# Patient Record
Sex: FEMALE | Race: WHITE | NOT HISPANIC OR LATINO
[De-identification: names, ages, dates, MRNs, and addresses within clinical notes are randomized per-mention and may not be internally consistent; named-entity substitution may affect disease eponyms.]

---

## 2017-03-30 ENCOUNTER — APPOINTMENT (OUTPATIENT)
Dept: GERIATRICS | Facility: CLINIC | Age: 67
End: 2017-03-30

## 2017-03-30 ENCOUNTER — OTHER (OUTPATIENT)
Age: 67
End: 2017-03-30

## 2017-03-30 VITALS
HEART RATE: 92 BPM | OXYGEN SATURATION: 94 % | WEIGHT: 96 LBS | RESPIRATION RATE: 16 BRPM | TEMPERATURE: 97.6 F | DIASTOLIC BLOOD PRESSURE: 82 MMHG | HEIGHT: 62 IN | BODY MASS INDEX: 17.66 KG/M2 | SYSTOLIC BLOOD PRESSURE: 142 MMHG

## 2017-03-30 DIAGNOSIS — L65.9 NONSCARRING HAIR LOSS, UNSPECIFIED: ICD-10-CM

## 2017-03-30 LAB
ALBUMIN SERPL ELPH-MCNC: 4 G/DL
ALP BLD-CCNC: 112 U/L
ALT SERPL-CCNC: 17 U/L
ANION GAP SERPL CALC-SCNC: 19 MMOL/L
AST SERPL-CCNC: 28 U/L
BILIRUB SERPL-MCNC: 1.2 MG/DL
BUN SERPL-MCNC: 29 MG/DL
CALCIUM SERPL-MCNC: 10 MG/DL
CHLORIDE SERPL-SCNC: 103 MMOL/L
CO2 SERPL-SCNC: 20 MMOL/L
CREAT SERPL-MCNC: 0.99 MG/DL
CRP SERPL-MCNC: <0.2 MG/DL
GLUCOSE SERPL-MCNC: 103 MG/DL
INR PPP: 1.12 RATIO
POTASSIUM SERPL-SCNC: 4.1 MMOL/L
PROT SERPL-MCNC: 7.1 G/DL
PT BLD: 12.7 SEC
SODIUM SERPL-SCNC: 142 MMOL/L
TSH SERPL-ACNC: 1.5 UIU/ML

## 2017-03-31 PROBLEM — L65.9 HAIR LOSS: Status: ACTIVE | Noted: 2017-03-30

## 2017-03-31 LAB
BASOPHILS # BLD AUTO: 0.07 K/UL
BASOPHILS NFR BLD AUTO: 1.3 %
EOSINOPHIL # BLD AUTO: 0.12 K/UL
EOSINOPHIL NFR BLD AUTO: 2.2 %
HCT VFR BLD CALC: 44.4 %
HGB BLD-MCNC: 14.6 G/DL
IMM GRANULOCYTES NFR BLD AUTO: 0.2 %
LYMPHOCYTES # BLD AUTO: 1.1 K/UL
LYMPHOCYTES NFR BLD AUTO: 20.3 %
MAN DIFF?: NORMAL
MCHC RBC-ENTMCNC: 29.9 PG
MCHC RBC-ENTMCNC: 32.9 GM/DL
MCV RBC AUTO: 90.8 FL
MONOCYTES # BLD AUTO: 0.53 K/UL
MONOCYTES NFR BLD AUTO: 9.8 %
NEUTROPHILS # BLD AUTO: 3.58 K/UL
NEUTROPHILS NFR BLD AUTO: 66.2 %
PLATELET # BLD AUTO: 98 K/UL
RBC # BLD: 4.89 M/UL
RBC # FLD: 14.7 %
WBC # FLD AUTO: 5.41 K/UL

## 2017-04-03 LAB
ADJUSTED MITOGEN: 0.64 IU/ML
ADJUSTED TB AG: 0.06 IU/ML
M TB IFN-G BLD-IMP: NEGATIVE
QUANTIFERON GOLD NIL: 0.04 IU/ML

## 2017-04-13 ENCOUNTER — APPOINTMENT (OUTPATIENT)
Dept: GERIATRICS | Facility: CLINIC | Age: 67
End: 2017-04-13

## 2017-04-13 VITALS
HEIGHT: 62 IN | RESPIRATION RATE: 15 BRPM | OXYGEN SATURATION: 98 % | HEART RATE: 76 BPM | SYSTOLIC BLOOD PRESSURE: 120 MMHG | DIASTOLIC BLOOD PRESSURE: 70 MMHG | TEMPERATURE: 98 F | WEIGHT: 95 LBS | BODY MASS INDEX: 17.48 KG/M2

## 2017-06-12 ENCOUNTER — APPOINTMENT (OUTPATIENT)
Dept: GERIATRICS | Facility: CLINIC | Age: 67
End: 2017-06-12

## 2017-06-12 ENCOUNTER — LABORATORY RESULT (OUTPATIENT)
Age: 67
End: 2017-06-12

## 2017-06-12 VITALS
WEIGHT: 99 LBS | SYSTOLIC BLOOD PRESSURE: 110 MMHG | BODY MASS INDEX: 18.22 KG/M2 | TEMPERATURE: 97.8 F | DIASTOLIC BLOOD PRESSURE: 70 MMHG | HEIGHT: 62 IN | HEART RATE: 80 BPM | OXYGEN SATURATION: 98 % | RESPIRATION RATE: 15 BRPM

## 2017-06-12 RX ORDER — ERGOCALCIFEROL 1.25 MG/1
1.25 MG CAPSULE, LIQUID FILLED ORAL
Qty: 8 | Refills: 0 | Status: DISCONTINUED | COMMUNITY
Start: 2017-03-30 | End: 2017-06-12

## 2017-06-12 RX ORDER — AMOXICILLIN 500 MG/1
500 CAPSULE ORAL
Qty: 21 | Refills: 0 | Status: DISCONTINUED | COMMUNITY
Start: 2017-05-08

## 2017-06-15 LAB
25(OH)D3 SERPL-MCNC: 35.4 NG/ML
ANION GAP SERPL CALC-SCNC: 18 MMOL/L
BASOPHILS # BLD AUTO: 0 K/UL
BASOPHILS NFR BLD AUTO: 0 %
BUN SERPL-MCNC: 26 MG/DL
CALCIUM SERPL-MCNC: 9.6 MG/DL
CHLORIDE SERPL-SCNC: 103 MMOL/L
CO2 SERPL-SCNC: 24 MMOL/L
CREAT SERPL-MCNC: 1.12 MG/DL
EOSINOPHIL # BLD AUTO: 0.14 K/UL
EOSINOPHIL NFR BLD AUTO: 2.7 %
GLUCOSE SERPL-MCNC: 93 MG/DL
HCT VFR BLD CALC: 43.8 %
HGB BLD-MCNC: 14.2 G/DL
LYMPHOCYTES # BLD AUTO: 1.32 K/UL
LYMPHOCYTES NFR BLD AUTO: 25.5 %
MAN DIFF?: NORMAL
MCHC RBC-ENTMCNC: 30.1 PG
MCHC RBC-ENTMCNC: 32.4 GM/DL
MCV RBC AUTO: 93 FL
MONOCYTES # BLD AUTO: 0.47 K/UL
MONOCYTES NFR BLD AUTO: 9.1 %
NEUTROPHILS # BLD AUTO: 3.24 K/UL
NEUTROPHILS NFR BLD AUTO: 59.1 %
PLATELET # BLD AUTO: 77 K/UL
POTASSIUM SERPL-SCNC: 5.2 MMOL/L
RBC # BLD: 4.71 M/UL
RBC # FLD: 14.8 %
SODIUM SERPL-SCNC: 145 MMOL/L
WBC # FLD AUTO: 5.16 K/UL

## 2017-09-11 ENCOUNTER — RX RENEWAL (OUTPATIENT)
Age: 67
End: 2017-09-11

## 2017-09-14 ENCOUNTER — TRANSCRIPTION ENCOUNTER (OUTPATIENT)
Age: 67
End: 2017-09-14

## 2017-09-18 ENCOUNTER — TRANSCRIPTION ENCOUNTER (OUTPATIENT)
Age: 67
End: 2017-09-18

## 2017-10-12 ENCOUNTER — APPOINTMENT (OUTPATIENT)
Dept: GERIATRICS | Facility: CLINIC | Age: 67
End: 2017-10-12
Payer: MEDICARE

## 2017-10-12 VITALS
OXYGEN SATURATION: 98 % | RESPIRATION RATE: 15 BRPM | HEART RATE: 77 BPM | BODY MASS INDEX: 17.67 KG/M2 | SYSTOLIC BLOOD PRESSURE: 130 MMHG | DIASTOLIC BLOOD PRESSURE: 78 MMHG | TEMPERATURE: 97.8 F | WEIGHT: 96.03 LBS | HEIGHT: 62 IN

## 2017-10-12 DIAGNOSIS — Z92.89 PERSONAL HISTORY OF OTHER MEDICAL TREATMENT: ICD-10-CM

## 2017-10-12 DIAGNOSIS — Z12.4 ENCOUNTER FOR SCREENING FOR MALIGNANT NEOPLASM OF CERVIX: ICD-10-CM

## 2017-10-12 DIAGNOSIS — Z01.419 ENCOUNTER FOR GYNECOLOGICAL EXAMINATION (GENERAL) (ROUTINE) W/OUT ABNORMAL FINDINGS: ICD-10-CM

## 2017-10-12 DIAGNOSIS — R63.4 ABNORMAL WEIGHT LOSS: ICD-10-CM

## 2017-10-12 PROCEDURE — 99214 OFFICE O/P EST MOD 30 MIN: CPT

## 2017-11-09 ENCOUNTER — APPOINTMENT (OUTPATIENT)
Dept: GERIATRICS | Facility: CLINIC | Age: 67
End: 2017-11-09

## 2017-12-07 ENCOUNTER — LABORATORY RESULT (OUTPATIENT)
Age: 67
End: 2017-12-07

## 2017-12-07 ENCOUNTER — APPOINTMENT (OUTPATIENT)
Dept: PULMONOLOGY | Facility: CLINIC | Age: 67
End: 2017-12-07
Payer: MEDICARE

## 2017-12-07 VITALS
HEART RATE: 93 BPM | RESPIRATION RATE: 16 BRPM | OXYGEN SATURATION: 97 % | WEIGHT: 95 LBS | BODY MASS INDEX: 17.48 KG/M2 | DIASTOLIC BLOOD PRESSURE: 90 MMHG | TEMPERATURE: 98.5 F | SYSTOLIC BLOOD PRESSURE: 130 MMHG | HEIGHT: 62 IN

## 2017-12-07 PROCEDURE — 94729 DIFFUSING CAPACITY: CPT

## 2017-12-07 PROCEDURE — 94726 PLETHYSMOGRAPHY LUNG VOLUMES: CPT

## 2017-12-07 PROCEDURE — 99204 OFFICE O/P NEW MOD 45 MIN: CPT | Mod: 25

## 2017-12-07 PROCEDURE — ZZZZZ: CPT

## 2017-12-07 PROCEDURE — 94060 EVALUATION OF WHEEZING: CPT

## 2017-12-08 ENCOUNTER — RX RENEWAL (OUTPATIENT)
Age: 67
End: 2017-12-08

## 2017-12-08 ENCOUNTER — MEDICATION RENEWAL (OUTPATIENT)
Age: 67
End: 2017-12-08

## 2017-12-14 LAB
A ALTERNATA IGE QN: <0.1 KUA/L
A FUMIGATUS IGE QN: <0.1 KUA/L
C ALBICANS IGE QN: <0.1 KUA/L
C HERBARUM IGE QN: <0.1 KUA/L
CAT DANDER IGE QN: 14.4 KUA/L
COMMON RAGWEED IGE QN: <0.1 KUA/L
D FARINAE IGE QN: 0.75 KUA/L
D PTERONYSS IGE QN: 0.44 KUA/L
DEPRECATED A ALTERNATA IGE RAST QL: 0
DEPRECATED A FUMIGATUS IGE RAST QL: 0
DEPRECATED C ALBICANS IGE RAST QL: 0
DEPRECATED C HERBARUM IGE RAST QL: 0
DEPRECATED CAT DANDER IGE RAST QL: ABNORMAL
DEPRECATED COMMON RAGWEED IGE RAST QL: 0
DEPRECATED D FARINAE IGE RAST QL: ABNORMAL
DEPRECATED D PTERONYSS IGE RAST QL: ABNORMAL
DEPRECATED DOG DANDER IGE RAST QL: ABNORMAL
DEPRECATED M RACEMOSUS IGE RAST QL: 0
DEPRECATED ROACH IGE RAST QL: 0
DEPRECATED TIMOTHY IGE RAST QL: 0
DEPRECATED WHITE OAK IGE RAST QL: 0
DOG DANDER IGE QN: 1.82 KUA/L
M RACEMOSUS IGE QN: <0.1 KUA/L
ROACH IGE QN: <0.1 KUA/L
TIMOTHY IGE QN: <0.1 KUA/L
WHITE OAK IGE QN: <0.1 KUA/L

## 2018-01-16 ENCOUNTER — APPOINTMENT (OUTPATIENT)
Dept: PULMONOLOGY | Facility: CLINIC | Age: 68
End: 2018-01-16
Payer: MEDICARE

## 2018-01-16 VITALS
HEART RATE: 88 BPM | RESPIRATION RATE: 16 BRPM | BODY MASS INDEX: 18.77 KG/M2 | DIASTOLIC BLOOD PRESSURE: 90 MMHG | HEIGHT: 62 IN | OXYGEN SATURATION: 97 % | SYSTOLIC BLOOD PRESSURE: 140 MMHG | WEIGHT: 102 LBS | TEMPERATURE: 98.5 F

## 2018-01-16 PROCEDURE — 99215 OFFICE O/P EST HI 40 MIN: CPT

## 2018-01-19 LAB — A1AT SERPL-MCNC: 160 MG/DL

## 2018-02-15 ENCOUNTER — APPOINTMENT (OUTPATIENT)
Dept: GERIATRICS | Facility: CLINIC | Age: 68
End: 2018-02-15
Payer: MEDICARE

## 2018-02-15 VITALS
DIASTOLIC BLOOD PRESSURE: 70 MMHG | HEART RATE: 75 BPM | WEIGHT: 106.04 LBS | RESPIRATION RATE: 16 BRPM | TEMPERATURE: 97.6 F | SYSTOLIC BLOOD PRESSURE: 140 MMHG | BODY MASS INDEX: 19.51 KG/M2 | HEIGHT: 62 IN | OXYGEN SATURATION: 96 %

## 2018-02-15 PROCEDURE — 99214 OFFICE O/P EST MOD 30 MIN: CPT | Mod: GC

## 2018-03-06 ENCOUNTER — APPOINTMENT (OUTPATIENT)
Dept: DERMATOLOGY | Facility: CLINIC | Age: 68
End: 2018-03-06
Payer: MEDICARE

## 2018-03-06 VITALS
WEIGHT: 104 LBS | DIASTOLIC BLOOD PRESSURE: 70 MMHG | HEIGHT: 62 IN | BODY MASS INDEX: 19.14 KG/M2 | SYSTOLIC BLOOD PRESSURE: 118 MMHG

## 2018-03-06 DIAGNOSIS — I78.1 NEVUS, NON-NEOPLASTIC: ICD-10-CM

## 2018-03-06 DIAGNOSIS — Z86.19 PERSONAL HISTORY OF OTHER INFECTIOUS AND PARASITIC DISEASES: ICD-10-CM

## 2018-03-06 DIAGNOSIS — Z86.79 PERSONAL HISTORY OF OTHER DISEASES OF THE CIRCULATORY SYSTEM: ICD-10-CM

## 2018-03-06 DIAGNOSIS — L72.0 EPIDERMAL CYST: ICD-10-CM

## 2018-03-06 DIAGNOSIS — Z87.19 PERSONAL HISTORY OF OTHER DISEASES OF THE DIGESTIVE SYSTEM: ICD-10-CM

## 2018-03-06 PROCEDURE — 99203 OFFICE O/P NEW LOW 30 MIN: CPT

## 2018-04-10 ENCOUNTER — APPOINTMENT (OUTPATIENT)
Dept: PULMONOLOGY | Facility: CLINIC | Age: 68
End: 2018-04-10
Payer: MEDICARE

## 2018-04-10 VITALS
HEIGHT: 62 IN | DIASTOLIC BLOOD PRESSURE: 70 MMHG | WEIGHT: 106 LBS | SYSTOLIC BLOOD PRESSURE: 130 MMHG | BODY MASS INDEX: 19.51 KG/M2 | TEMPERATURE: 98.3 F | HEART RATE: 80 BPM | RESPIRATION RATE: 16 BRPM

## 2018-04-10 PROCEDURE — 99215 OFFICE O/P EST HI 40 MIN: CPT

## 2018-04-10 RX ORDER — TIOTROPIUM BROMIDE 18 UG/1
18 CAPSULE ORAL; RESPIRATORY (INHALATION)
Qty: 30 | Refills: 5 | Status: DISCONTINUED | COMMUNITY
Start: 2017-12-07 | End: 2018-04-10

## 2018-05-02 ENCOUNTER — APPOINTMENT (OUTPATIENT)
Dept: PULMONOLOGY | Facility: CLINIC | Age: 68
End: 2018-05-02

## 2018-05-17 ENCOUNTER — APPOINTMENT (OUTPATIENT)
Dept: GERIATRICS | Facility: CLINIC | Age: 68
End: 2018-05-17
Payer: MEDICARE

## 2018-05-17 VITALS — WEIGHT: 107 LBS | BODY MASS INDEX: 19.57 KG/M2

## 2018-05-17 VITALS
WEIGHT: 117 LBS | SYSTOLIC BLOOD PRESSURE: 140 MMHG | HEART RATE: 68 BPM | DIASTOLIC BLOOD PRESSURE: 80 MMHG | BODY MASS INDEX: 21.4 KG/M2 | OXYGEN SATURATION: 98 % | TEMPERATURE: 98 F

## 2018-05-17 DIAGNOSIS — Z23 ENCOUNTER FOR IMMUNIZATION: ICD-10-CM

## 2018-05-17 PROCEDURE — 99214 OFFICE O/P EST MOD 30 MIN: CPT

## 2018-05-17 RX ORDER — ZOSTER VACCINE RECOMBINANT, ADJUVANTED 50 MCG/0.5
50 KIT INTRAMUSCULAR
Qty: 1 | Refills: 0 | Status: DISCONTINUED | COMMUNITY
Start: 2018-05-17 | End: 2018-05-17

## 2018-05-19 PROBLEM — Z23 NEED FOR SHINGLES VACCINE: Status: ACTIVE | Noted: 2018-05-17

## 2018-09-08 ENCOUNTER — MEDICATION RENEWAL (OUTPATIENT)
Age: 68
End: 2018-09-08

## 2018-10-26 ENCOUNTER — APPOINTMENT (OUTPATIENT)
Dept: GERIATRICS | Facility: CLINIC | Age: 68
End: 2018-10-26
Payer: MEDICARE

## 2018-10-26 ENCOUNTER — LABORATORY RESULT (OUTPATIENT)
Age: 68
End: 2018-10-26

## 2018-10-26 VITALS
WEIGHT: 110 LBS | TEMPERATURE: 97.5 F | DIASTOLIC BLOOD PRESSURE: 70 MMHG | BODY MASS INDEX: 20.12 KG/M2 | HEART RATE: 79 BPM | OXYGEN SATURATION: 98 % | SYSTOLIC BLOOD PRESSURE: 132 MMHG

## 2018-10-26 VITALS — SYSTOLIC BLOOD PRESSURE: 132 MMHG | DIASTOLIC BLOOD PRESSURE: 70 MMHG

## 2018-10-26 DIAGNOSIS — Z23 ENCOUNTER FOR IMMUNIZATION: ICD-10-CM

## 2018-10-26 DIAGNOSIS — H54.7 UNSPECIFIED VISUAL LOSS: ICD-10-CM

## 2018-10-26 PROCEDURE — G0008: CPT | Mod: GC

## 2018-10-26 PROCEDURE — 90662 IIV NO PRSV INCREASED AG IM: CPT | Mod: GC

## 2018-10-26 PROCEDURE — 99214 OFFICE O/P EST MOD 30 MIN: CPT | Mod: GC,25

## 2018-10-30 LAB
ALBUMIN SERPL ELPH-MCNC: 4.2 G/DL
ALP BLD-CCNC: 96 U/L
ALT SERPL-CCNC: 17 U/L
ANION GAP SERPL CALC-SCNC: 13 MMOL/L
AST SERPL-CCNC: 25 U/L
BASOPHILS # BLD AUTO: 0.04 K/UL
BASOPHILS NFR BLD AUTO: 0.9 %
BILIRUB SERPL-MCNC: 0.9 MG/DL
BUN SERPL-MCNC: 24 MG/DL
CALCIUM SERPL-MCNC: 9.6 MG/DL
CHLORIDE SERPL-SCNC: 104 MMOL/L
CO2 SERPL-SCNC: 23 MMOL/L
CREAT SERPL-MCNC: 0.92 MG/DL
EOSINOPHIL # BLD AUTO: 0.16 K/UL
EOSINOPHIL NFR BLD AUTO: 3.5 %
GLUCOSE SERPL-MCNC: 91 MG/DL
HCT VFR BLD CALC: 40.1 %
HGB BLD-MCNC: 13.6 G/DL
IMM GRANULOCYTES NFR BLD AUTO: 0.2 %
LYMPHOCYTES # BLD AUTO: 1 K/UL
LYMPHOCYTES NFR BLD AUTO: 21.9 %
MAN DIFF?: NORMAL
MCHC RBC-ENTMCNC: 31.1 PG
MCHC RBC-ENTMCNC: 33.9 GM/DL
MCV RBC AUTO: 91.8 FL
MONOCYTES # BLD AUTO: 0.47 K/UL
MONOCYTES NFR BLD AUTO: 10.3 %
NEUTROPHILS # BLD AUTO: 2.88 K/UL
NEUTROPHILS NFR BLD AUTO: 63.2 %
PLATELET # BLD AUTO: 69 K/UL
POTASSIUM SERPL-SCNC: 4.2 MMOL/L
PROT SERPL-MCNC: 6.6 G/DL
RBC # BLD: 4.37 M/UL
RBC # FLD: 14.2 %
SODIUM SERPL-SCNC: 140 MMOL/L
WBC # FLD AUTO: 4.56 K/UL

## 2018-11-02 ENCOUNTER — RX RENEWAL (OUTPATIENT)
Age: 68
End: 2018-11-02

## 2018-11-19 ENCOUNTER — RX RENEWAL (OUTPATIENT)
Age: 68
End: 2018-11-19

## 2019-02-21 ENCOUNTER — APPOINTMENT (OUTPATIENT)
Dept: OTOLARYNGOLOGY | Facility: CLINIC | Age: 69
End: 2019-02-21
Payer: MEDICARE

## 2019-02-21 VITALS
DIASTOLIC BLOOD PRESSURE: 90 MMHG | HEART RATE: 83 BPM | BODY MASS INDEX: 19.32 KG/M2 | HEIGHT: 62 IN | SYSTOLIC BLOOD PRESSURE: 145 MMHG | WEIGHT: 105 LBS

## 2019-02-21 DIAGNOSIS — H61.23 IMPACTED CERUMEN, BILATERAL: ICD-10-CM

## 2019-02-21 DIAGNOSIS — H90.3 SENSORINEURAL HEARING LOSS, BILATERAL: ICD-10-CM

## 2019-02-21 PROCEDURE — 92567 TYMPANOMETRY: CPT

## 2019-02-21 PROCEDURE — 99203 OFFICE O/P NEW LOW 30 MIN: CPT | Mod: 25

## 2019-02-21 PROCEDURE — 92557 COMPREHENSIVE HEARING TEST: CPT

## 2019-02-21 PROCEDURE — 69210 REMOVE IMPACTED EAR WAX UNI: CPT

## 2019-02-21 NOTE — ASSESSMENT
[FreeTextEntry1] : Ms. BHATTI is a 68 year female with b/l sensorineural hearing loss, cerumen.  \par - hearing aid evaluation \par - annual audio\par - cerumen removal prn \par HTN\par - F/U with PMD

## 2019-02-21 NOTE — HISTORY OF PRESENT ILLNESS
[de-identified] : 69 y/o F with several years of decreased hearing b/l.  No tinnitus, no d/c, no pain, no Vertigo.  No nasal congestion, no sinus pain or pressure.   no hx of loud noise exposure.

## 2019-02-21 NOTE — PROCEDURE
[FreeTextEntry3] : Procedure - Cerumen Removal. \par After informed verbal consent is obtained, cerumen is removed from the b/l ear canal with a curette.  Normal appearing canal following removal.\par

## 2019-02-21 NOTE — CONSULT LETTER
[Dear  ___] : Dear  [unfilled], [Courtesy Letter:] : I had the pleasure of seeing your patient, [unfilled], in my office today. [Please see my note below.] : Please see my note below. [Consult Closing:] : Thank you very much for allowing me to participate in the care of this patient.  If you have any questions, please do not hesitate to contact me. [Sincerely,] : Sincerely, [FreeTextEntry3] : Maura Chamorro M.D.\par Attending Physician,  \par Department of Otolaryngology - Head and Neck Surgery\par [facility] \par Office: [phone]\par Fax: [phone]\par

## 2019-02-21 NOTE — PHYSICAL EXAM
[Midline] : trachea located in midline position [Normal] : no rashes [de-identified] : b/l isaac.

## 2019-04-02 ENCOUNTER — APPOINTMENT (OUTPATIENT)
Dept: ORTHOPEDIC SURGERY | Facility: CLINIC | Age: 69
End: 2019-04-02
Payer: MEDICARE

## 2019-04-02 ENCOUNTER — APPOINTMENT (OUTPATIENT)
Age: 69
End: 2019-04-02
Payer: MEDICARE

## 2019-04-02 VITALS
HEART RATE: 85 BPM | BODY MASS INDEX: 18.84 KG/M2 | DIASTOLIC BLOOD PRESSURE: 86 MMHG | SYSTOLIC BLOOD PRESSURE: 143 MMHG | HEIGHT: 62.5 IN | WEIGHT: 105 LBS

## 2019-04-02 DIAGNOSIS — S42.202A UNSPECIFIED FRACTURE OF UPPER END OF LEFT HUMERUS, INITIAL ENCOUNTER FOR CLOSED FRACTURE: ICD-10-CM

## 2019-04-02 PROCEDURE — 73030 X-RAY EXAM OF SHOULDER: CPT | Mod: LT

## 2019-04-02 PROCEDURE — 99203 OFFICE O/P NEW LOW 30 MIN: CPT

## 2019-04-02 PROCEDURE — 73060 X-RAY EXAM OF HUMERUS: CPT | Mod: LT

## 2019-04-02 NOTE — DISCUSSION/SUMMARY
[de-identified] : Discussion had with patient, sling, rest, ice \par Patient will follow up with Liang next week \par Patients questions were answered and patient is satisfied with today's visit\par \par \par Rings removed from hand, given to patient

## 2019-04-02 NOTE — HISTORY OF PRESENT ILLNESS
[Pain Location] : pain [de-identified] : Patient is a 69 year old female who presents c/o left shoulder and upper arm pain s/p falling yesterday. patient tripped while walking dog yesterday landing on left shoulder.  no numbness or weakness to arm. patient states decreased ROM due to pain.

## 2019-04-02 NOTE — PHYSICAL EXAM
[UE/LE] : Sensory: Intact in bilateral upper & lower extremities [ALL] : dorsalis pedis, posterior tibial, femoral, popliteal, and radial 2+ and symmetric bilaterally [Normal] : Oriented to person, place, and time, insight and judgement were intact and the affect was normal [Poor Appearance] : well-appearing [Acute Distress] : not in acute distress [de-identified] : Skin Warm, dry, no erythema, no warmth,no lesions\par Positive swelling to humerus, positive bruising throughout humerus \par Tenderness - proximal and mid humerus \par \par ROM \par Decreased 2nd to pain active and passive \par \par Sensation to touch intact to lateral to axillary and musculocutaneous nerve, distal motor function intact to elbow, wrist and hand \par pulse intact, cap refill intact\par  [de-identified] : 3 view left shoulder reveals positive proximal humerus fx \par 2 view left humerus proximal humerus fx

## 2019-04-03 ENCOUNTER — APPOINTMENT (OUTPATIENT)
Dept: PHARMACY | Facility: CLINIC | Age: 69
End: 2019-04-03

## 2019-04-03 ENCOUNTER — RX CHANGE (OUTPATIENT)
Age: 69
End: 2019-04-03

## 2019-04-03 PROBLEM — S42.202A CLOSED FRACTURE OF PROXIMAL END OF LEFT HUMERUS, UNSPECIFIED FRACTURE MORPHOLOGY, INITIAL ENCOUNTER: Status: ACTIVE | Noted: 2019-04-02

## 2019-04-05 ENCOUNTER — APPOINTMENT (OUTPATIENT)
Dept: ORTHOPEDIC SURGERY | Facility: CLINIC | Age: 69
End: 2019-04-05

## 2019-04-09 ENCOUNTER — APPOINTMENT (OUTPATIENT)
Dept: ORTHOPEDIC SURGERY | Facility: CLINIC | Age: 69
End: 2019-04-09
Payer: MEDICARE

## 2019-04-09 VITALS
DIASTOLIC BLOOD PRESSURE: 81 MMHG | HEART RATE: 99 BPM | SYSTOLIC BLOOD PRESSURE: 153 MMHG | HEIGHT: 62.5 IN | WEIGHT: 105 LBS | BODY MASS INDEX: 18.84 KG/M2

## 2019-04-09 PROCEDURE — 73030 X-RAY EXAM OF SHOULDER: CPT | Mod: LT

## 2019-04-09 PROCEDURE — 99214 OFFICE O/P EST MOD 30 MIN: CPT | Mod: 57

## 2019-04-09 PROCEDURE — 23600 CLTX PROX HUMRL FX W/O MNPJ: CPT | Mod: LT

## 2019-04-23 ENCOUNTER — RX RENEWAL (OUTPATIENT)
Age: 69
End: 2019-04-23

## 2019-04-23 ENCOUNTER — APPOINTMENT (OUTPATIENT)
Dept: ORTHOPEDIC SURGERY | Facility: CLINIC | Age: 69
End: 2019-04-23
Payer: MEDICARE

## 2019-04-23 VITALS
HEART RATE: 81 BPM | WEIGHT: 105 LBS | DIASTOLIC BLOOD PRESSURE: 77 MMHG | HEIGHT: 62.5 IN | SYSTOLIC BLOOD PRESSURE: 155 MMHG | BODY MASS INDEX: 18.84 KG/M2

## 2019-04-23 PROCEDURE — 99024 POSTOP FOLLOW-UP VISIT: CPT

## 2019-04-23 PROCEDURE — 73030 X-RAY EXAM OF SHOULDER: CPT | Mod: LT

## 2019-04-23 NOTE — HISTORY OF PRESENT ILLNESS
[de-identified] : The patient presents 3 weeks following left proximal humerus fracture. She has been compliant with her shoulder immobilizer. She is taking Ibuprofen 200 mg 3x a day for pain relief. She denies upper extremity numbness or tingling.

## 2019-04-23 NOTE — REASON FOR VISIT
[Follow-Up Visit] : a follow-up visit for [Shoulder Pain] : shoulder pain [FreeTextEntry2] : left shoulder fracture

## 2019-04-23 NOTE — DISCUSSION/SUMMARY
[de-identified] : The patient's fracture remains in good position. It is healing. She will work on elbow motion. She will return in 3 weeks.

## 2019-04-23 NOTE — PHYSICAL EXAM
[UE] : Sensory: Intact in bilateral upper extremities [Bicep] : biceps 2+ and symmetric bilaterally [Rad] : radial 2+ and symmetric bilaterally [Normal] : Alert and in no acute distress [Poor Appearance] : well-appearing [Acute Distress] : not in acute distress [Obese] : obese [de-identified] : The patient has no respiratory distress. Mood and affect are normal. The patient is alert and oriented to person, place and time.\par The patient reports mild pain with motion of the cervical spine. Examination of the left shoulder demonstrates tenderness, and swelling. She has mild pain with left elbow motion. The skin is otherwise intact. There is no lymphedema. Shoulder range of motion is not assessed because of the fracture. [de-identified] : AP and transscapular x-rays of the left shoulder demonstrate proximal humeral fracture in good position

## 2019-04-25 ENCOUNTER — RX CHANGE (OUTPATIENT)
Age: 69
End: 2019-04-25

## 2019-04-29 ENCOUNTER — RX RENEWAL (OUTPATIENT)
Age: 69
End: 2019-04-29

## 2019-05-09 ENCOUNTER — APPOINTMENT (OUTPATIENT)
Dept: GERIATRICS | Facility: CLINIC | Age: 69
End: 2019-05-09
Payer: MEDICARE

## 2019-05-09 VITALS
TEMPERATURE: 98.1 F | HEART RATE: 78 BPM | SYSTOLIC BLOOD PRESSURE: 136 MMHG | WEIGHT: 105.6 LBS | BODY MASS INDEX: 19.01 KG/M2 | OXYGEN SATURATION: 98 % | DIASTOLIC BLOOD PRESSURE: 76 MMHG

## 2019-05-09 DIAGNOSIS — R21 RASH AND OTHER NONSPECIFIC SKIN ERUPTION: ICD-10-CM

## 2019-05-09 PROCEDURE — 99214 OFFICE O/P EST MOD 30 MIN: CPT

## 2019-05-09 RX ORDER — LOSARTAN POTASSIUM 25 MG/1
25 TABLET, FILM COATED ORAL
Qty: 30 | Refills: 1 | Status: DISCONTINUED | COMMUNITY
Start: 2019-04-25 | End: 2019-05-09

## 2019-05-09 NOTE — SOCIAL HISTORY
[No falls in past year] : Patient reported no falls in the past year [Independent] : managing finances [Smoke Detector] : smoke detector [Carbon Monoxide Detector] : carbon monoxide detector [Night Light] : night light [Anti-Slip Measures] : anti-slip measures [Seat Belt] :  uses seat belt [Driving] : driving [Guns at Home] : no guns at home [Travel to Developing Areas] : does not  travel to developing areas [FreeTextEntry6] : drives

## 2019-05-09 NOTE — ASSESSMENT
[FreeTextEntry1] : htn: c/w current lisinopril, bp controlled\par xerosis: twice daily moisturizer, and prn steroid cream,\par thrombocytopenia: repeat cbc today\par anxiety and depression: c/w sertraline, f/u with dr. pineda,  she is requesting to see weekly psychologist\par asthma: controlled off advair now.  less trigger without cat\par left proximal humerus fracture with left arm in shoulder immobilizer; follow up with ortho in 1 week.\par \par \par \par \par \par

## 2019-05-09 NOTE — HISTORY OF PRESENT ILLNESS
[FreeTextEntry1] : 68 y/o F w/ history of COPD, HCV s/p treatment, chronic thrombocytopenia, Depression, Anxiety presents for follow up for htn.\par \par patient develop possible reaction with losartan,so she was changed to lisinopril.\par today with bp check\par \par itching has persisted "everywhere, but my back is the worse" worse at night.\par using curel itch defense with help.  uses sarna as well.\par increased sweating in the night few times a week.  denies any coughing, fevers, or weight loss.\par she also feels nauseous, with altered taste.\par left proximal humerus fracture with left arm in shoulder immobilizer\par \par COPD/asthma: symptoms are well controlled without advair since her cat passed. but now they have a puppy.  has not needed  her proair. No chest pain, SOB, or wheezing. \par \par Hep C: treated; following with Dr. Canas, hepatology.\par \par Anxiety/depression: mood has been stable. Follows with Dr. Whitehead. Takes sertraline. Rarely uses ativan for anxiety.  \par \par \par

## 2019-05-09 NOTE — PHYSICAL EXAM
[General Appearance - Alert] : alert [General Appearance - In No Acute Distress] : in no acute distress [General Appearance - Well-Appearing] : healthy appearing [Sclera] : the sclera and conjunctiva were normal [Extraocular Movements] : extraocular movements were intact [No Oral Pallor] : no oral pallor [No Oral Cyanosis] : no oral cyanosis [Oropharynx] : The oropharynx was normal [Neck Appearance] : the appearance of the neck was normal [Respiration, Rhythm And Depth] : normal respiratory rhythm and effort [Exaggerated Use Of Accessory Muscles For Inspiration] : no accessory muscle use [Auscultation Breath Sounds / Voice Sounds] : lungs were clear to auscultation bilaterally [Heart Sounds] : normal S1 and S2 [Heart Rate And Rhythm] : heart rate was normal and rhythm regular [Murmurs] : no murmurs [Edema] : there was no peripheral edema [Bowel Sounds] : normal bowel sounds [Abdomen Soft] : soft [Abdomen Tenderness] : non-tender [Abnormal Walk] : normal gait [Nail Clubbing] : no clubbing  or cyanosis of the fingernails [Involuntary Movements] : no involuntary movements were seen [] : no rash [No Focal Deficits] : no focal deficits [Affect] : the affect was normal [Mood] : the mood was normal [Memory Recent] : recent memory was not impaired [Memory Remote] : remote memory was not impaired [Cervical Lymph Nodes Enlarged Posterior Bilaterally] : posterior cervical [Cervical Lymph Nodes Enlarged Anterior Bilaterally] : anterior cervical [Supraclavicular Lymph Nodes Enlarged Bilaterally] : supraclavicular [Skin Lesions] : no skin lesions [FreeTextEntry1] : left proximal humerus fracture with left arm in shoulder immobilizer

## 2019-05-09 NOTE — REVIEW OF SYSTEMS
[Eyesight Problems] : eyesight problems [Loss Of Hearing] : hearing loss [As Noted in HPI] : as noted in HPI [Easy Bruising] : a tendency for easy bruising [Negative] : Endocrine

## 2019-05-10 LAB
ALBUMIN SERPL ELPH-MCNC: 4.2 G/DL
ALP BLD-CCNC: 123 U/L
ALT SERPL-CCNC: 10 U/L
ANION GAP SERPL CALC-SCNC: 11 MMOL/L
AST SERPL-CCNC: 24 U/L
BASOPHILS # BLD AUTO: 0.07 K/UL
BASOPHILS NFR BLD AUTO: 1.2 %
BILIRUB SERPL-MCNC: 0.8 MG/DL
BUN SERPL-MCNC: 27 MG/DL
CALCIUM SERPL-MCNC: 9.9 MG/DL
CHLORIDE SERPL-SCNC: 105 MMOL/L
CO2 SERPL-SCNC: 27 MMOL/L
CREAT SERPL-MCNC: 0.94 MG/DL
EOSINOPHIL # BLD AUTO: 0.09 K/UL
EOSINOPHIL NFR BLD AUTO: 1.6 %
GLUCOSE SERPL-MCNC: 76 MG/DL
HCT VFR BLD CALC: 42.3 %
HGB BLD-MCNC: 13.4 G/DL
IMM GRANULOCYTES NFR BLD AUTO: 0.4 %
LYMPHOCYTES # BLD AUTO: 1 K/UL
LYMPHOCYTES NFR BLD AUTO: 17.8 %
MAN DIFF?: NORMAL
MCHC RBC-ENTMCNC: 30.4 PG
MCHC RBC-ENTMCNC: 31.7 GM/DL
MCV RBC AUTO: 95.9 FL
MONOCYTES # BLD AUTO: 0.55 K/UL
MONOCYTES NFR BLD AUTO: 9.8 %
NEUTROPHILS # BLD AUTO: 3.9 K/UL
NEUTROPHILS NFR BLD AUTO: 69.2 %
PLATELET # BLD AUTO: 84 K/UL
POTASSIUM SERPL-SCNC: 5.5 MMOL/L
PROT SERPL-MCNC: 7 G/DL
RBC # BLD: 4.41 M/UL
RBC # FLD: 13.8 %
SODIUM SERPL-SCNC: 143 MMOL/L
WBC # FLD AUTO: 5.63 K/UL

## 2019-05-14 ENCOUNTER — APPOINTMENT (OUTPATIENT)
Dept: ORTHOPEDIC SURGERY | Facility: CLINIC | Age: 69
End: 2019-05-14
Payer: MEDICARE

## 2019-05-14 VITALS
WEIGHT: 105 LBS | BODY MASS INDEX: 18.84 KG/M2 | DIASTOLIC BLOOD PRESSURE: 70 MMHG | HEIGHT: 62.5 IN | HEART RATE: 78 BPM | SYSTOLIC BLOOD PRESSURE: 148 MMHG

## 2019-05-14 PROCEDURE — 73030 X-RAY EXAM OF SHOULDER: CPT | Mod: LT

## 2019-05-14 PROCEDURE — 99024 POSTOP FOLLOW-UP VISIT: CPT

## 2019-06-04 ENCOUNTER — APPOINTMENT (OUTPATIENT)
Dept: ORTHOPEDIC SURGERY | Facility: CLINIC | Age: 69
End: 2019-06-04
Payer: MEDICARE

## 2019-06-04 VITALS
HEART RATE: 69 BPM | WEIGHT: 102 LBS | BODY MASS INDEX: 18.77 KG/M2 | SYSTOLIC BLOOD PRESSURE: 168 MMHG | DIASTOLIC BLOOD PRESSURE: 81 MMHG | HEIGHT: 62 IN

## 2019-06-04 PROCEDURE — 99024 POSTOP FOLLOW-UP VISIT: CPT

## 2019-07-01 ENCOUNTER — NON-APPOINTMENT (OUTPATIENT)
Age: 69
End: 2019-07-01

## 2019-07-01 ENCOUNTER — APPOINTMENT (OUTPATIENT)
Dept: OPHTHALMOLOGY | Facility: CLINIC | Age: 69
End: 2019-07-01
Payer: MEDICARE

## 2019-07-01 PROCEDURE — 92134 CPTRZ OPH DX IMG PST SGM RTA: CPT

## 2019-07-01 PROCEDURE — 92004 COMPRE OPH EXAM NEW PT 1/>: CPT

## 2019-07-01 PROCEDURE — 92015 DETERMINE REFRACTIVE STATE: CPT

## 2019-07-11 ENCOUNTER — APPOINTMENT (OUTPATIENT)
Dept: GERIATRICS | Facility: CLINIC | Age: 69
End: 2019-07-11
Payer: MEDICARE

## 2019-07-11 VITALS
DIASTOLIC BLOOD PRESSURE: 72 MMHG | HEART RATE: 75 BPM | BODY MASS INDEX: 19.43 KG/M2 | TEMPERATURE: 97.9 F | OXYGEN SATURATION: 98 % | HEIGHT: 62 IN | RESPIRATION RATE: 17 BRPM | WEIGHT: 105.6 LBS | SYSTOLIC BLOOD PRESSURE: 142 MMHG

## 2019-07-11 PROCEDURE — 99214 OFFICE O/P EST MOD 30 MIN: CPT

## 2019-07-11 RX ORDER — ALBUTEROL SULFATE 90 UG/1
108 (90 BASE) AEROSOL, METERED RESPIRATORY (INHALATION)
Qty: 1 | Refills: 1 | Status: DISCONTINUED | COMMUNITY
Start: 2017-12-07 | End: 2019-07-11

## 2019-07-11 RX ORDER — TRAMADOL HYDROCHLORIDE 50 MG/1
50 TABLET, COATED ORAL
Qty: 30 | Refills: 0 | Status: DISCONTINUED | COMMUNITY
Start: 2019-04-02 | End: 2019-07-11

## 2019-07-11 RX ORDER — HYDROCORTISONE 25 MG/G
2.5 CREAM TOPICAL
Qty: 1 | Refills: 4 | Status: DISCONTINUED | COMMUNITY
Start: 2018-10-29 | End: 2019-07-11

## 2019-07-11 RX ORDER — IRBESARTAN 75 MG/1
75 TABLET ORAL DAILY
Qty: 90 | Refills: 2 | Status: DISCONTINUED | COMMUNITY
Start: 2017-10-12 | End: 2019-07-11

## 2019-07-11 NOTE — SOCIAL HISTORY
[No falls in past year] : Patient reported no falls in the past year [Smoke Detector] : smoke detector [Independent] : managing finances [Carbon Monoxide Detector] : carbon monoxide detector [Night Light] : night light [Seat Belt] :  uses seat belt [Anti-Slip Measures] : anti-slip measures [Driving] : driving [Guns at Home] : no guns at home [Travel to Developing Areas] : does not  travel to developing areas [FreeTextEntry6] : drives

## 2019-07-11 NOTE — PHYSICAL EXAM
[General Appearance - Alert] : alert [General Appearance - In No Acute Distress] : in no acute distress [General Appearance - Well-Appearing] : healthy appearing [Sclera] : the sclera and conjunctiva were normal [Extraocular Movements] : extraocular movements were intact [No Oral Pallor] : no oral pallor [No Oral Cyanosis] : no oral cyanosis [Oropharynx] : The oropharynx was normal [Neck Appearance] : the appearance of the neck was normal [Respiration, Rhythm And Depth] : normal respiratory rhythm and effort [Exaggerated Use Of Accessory Muscles For Inspiration] : no accessory muscle use [Auscultation Breath Sounds / Voice Sounds] : lungs were clear to auscultation bilaterally [Heart Rate And Rhythm] : heart rate was normal and rhythm regular [Heart Sounds] : normal S1 and S2 [Edema] : there was no peripheral edema [Murmurs] : no murmurs [Bowel Sounds] : normal bowel sounds [Abdomen Soft] : soft [Abdomen Tenderness] : non-tender [Cervical Lymph Nodes Enlarged Posterior Bilaterally] : posterior cervical [Cervical Lymph Nodes Enlarged Anterior Bilaterally] : anterior cervical [Supraclavicular Lymph Nodes Enlarged Bilaterally] : supraclavicular [Abnormal Walk] : normal gait [Nail Clubbing] : no clubbing  or cyanosis of the fingernails [Involuntary Movements] : no involuntary movements were seen [No Focal Deficits] : no focal deficits [Skin Lesions] : no skin lesions [] : no rash [Affect] : the affect was normal [Mood] : the mood was normal [Memory Recent] : recent memory was not impaired [Memory Remote] : remote memory was not impaired [FreeTextEntry1] : left proximal humerus fracture with left arm in shoulder immobilizer

## 2019-07-11 NOTE — ASSESSMENT
[FreeTextEntry1] : htn: c/w current lisinopril, bp controlled\par thrombocytopenia: plt 84 in may, easy bruising. follow up labwork\par anxiety and depression: c/w sertraline, f/u with dr. pineda and psychologist\par asthma: controlled off advair now.  less trigger without cat\par hx left proximal humerus fracture with left arm : healed, following back up with ortho. no longer taking pain medications\par \par \par \par \par

## 2019-07-11 NOTE — HISTORY OF PRESENT ILLNESS
[FreeTextEntry1] : 70 y/o F w/ history of COPD, HCV s/p treatment, chronic thrombocytopenia, Depression, Anxiety presents for follow up for htn.\par \par denies lightheadedness, dizziness, shortness of breath, or palpitations.  adherent with medications\par \par \par she is otherwise without acute complaints today.  \par Hep C: treated; following with Dr. Canas, hepatology.\par Anxiety/depression: mood has been stable. Follows with Dr. Whitehead. Takes sertraline. Rarely uses ativan for anxiety.  has not yet seen psychologist.\par \par \par

## 2019-07-16 ENCOUNTER — APPOINTMENT (OUTPATIENT)
Dept: ORTHOPEDIC SURGERY | Facility: CLINIC | Age: 69
End: 2019-07-16
Payer: MEDICARE

## 2019-07-16 VITALS
SYSTOLIC BLOOD PRESSURE: 179 MMHG | HEIGHT: 62 IN | BODY MASS INDEX: 19.32 KG/M2 | DIASTOLIC BLOOD PRESSURE: 78 MMHG | WEIGHT: 105 LBS | HEART RATE: 74 BPM

## 2019-07-16 PROCEDURE — 99213 OFFICE O/P EST LOW 20 MIN: CPT

## 2019-07-31 ENCOUNTER — RX RENEWAL (OUTPATIENT)
Age: 69
End: 2019-07-31

## 2019-09-03 ENCOUNTER — APPOINTMENT (OUTPATIENT)
Dept: ORTHOPEDIC SURGERY | Facility: CLINIC | Age: 69
End: 2019-09-03
Payer: MEDICARE

## 2019-09-03 VITALS
WEIGHT: 105 LBS | HEART RATE: 71 BPM | HEIGHT: 62 IN | BODY MASS INDEX: 19.32 KG/M2 | SYSTOLIC BLOOD PRESSURE: 126 MMHG | DIASTOLIC BLOOD PRESSURE: 72 MMHG

## 2019-09-03 DIAGNOSIS — S42.292D OTHER DISPLACED FRACTURE OF UPPER END OF LEFT HUMERUS, SUBSEQUENT ENCOUNTER FOR FRACTURE WITH ROUTINE HEALING: ICD-10-CM

## 2019-09-03 PROCEDURE — 99213 OFFICE O/P EST LOW 20 MIN: CPT

## 2019-10-18 ENCOUNTER — APPOINTMENT (OUTPATIENT)
Dept: OPHTHALMOLOGY | Facility: CLINIC | Age: 69
End: 2019-10-18

## 2019-10-31 ENCOUNTER — APPOINTMENT (OUTPATIENT)
Dept: OPHTHALMOLOGY | Facility: CLINIC | Age: 69
End: 2019-10-31

## 2019-11-14 ENCOUNTER — APPOINTMENT (OUTPATIENT)
Dept: OPHTHALMOLOGY | Facility: CLINIC | Age: 69
End: 2019-11-14
Payer: MEDICARE

## 2019-11-14 ENCOUNTER — NON-APPOINTMENT (OUTPATIENT)
Age: 69
End: 2019-11-14

## 2019-11-14 PROCEDURE — 92134 CPTRZ OPH DX IMG PST SGM RTA: CPT

## 2019-11-14 PROCEDURE — 92012 INTRM OPH EXAM EST PATIENT: CPT

## 2019-11-21 ENCOUNTER — APPOINTMENT (OUTPATIENT)
Dept: GERIATRICS | Facility: CLINIC | Age: 69
End: 2019-11-21

## 2020-01-06 ENCOUNTER — APPOINTMENT (OUTPATIENT)
Dept: OPHTHALMOLOGY | Facility: CLINIC | Age: 70
End: 2020-01-06

## 2020-01-24 ENCOUNTER — RX RENEWAL (OUTPATIENT)
Age: 70
End: 2020-01-24

## 2020-02-24 ENCOUNTER — APPOINTMENT (OUTPATIENT)
Dept: GERIATRICS | Facility: CLINIC | Age: 70
End: 2020-02-24
Payer: MEDICARE

## 2020-02-24 VITALS
OXYGEN SATURATION: 96 % | BODY MASS INDEX: 19.4 KG/M2 | HEART RATE: 77 BPM | DIASTOLIC BLOOD PRESSURE: 70 MMHG | HEIGHT: 62 IN | TEMPERATURE: 97.6 F | SYSTOLIC BLOOD PRESSURE: 138 MMHG | WEIGHT: 105.4 LBS | RESPIRATION RATE: 17 BRPM

## 2020-02-24 DIAGNOSIS — H91.90 UNSPECIFIED HEARING LOSS, UNSPECIFIED EAR: ICD-10-CM

## 2020-02-24 DIAGNOSIS — Z12.11 ENCOUNTER FOR SCREENING FOR MALIGNANT NEOPLASM OF COLON: ICD-10-CM

## 2020-02-24 PROCEDURE — 99214 OFFICE O/P EST MOD 30 MIN: CPT

## 2020-02-26 NOTE — PHYSICAL EXAM
[General Appearance - Alert] : alert [General Appearance - In No Acute Distress] : in no acute distress [General Appearance - Well-Appearing] : healthy appearing [Sclera] : the sclera and conjunctiva were normal [Extraocular Movements] : extraocular movements were intact [No Oral Pallor] : no oral pallor [No Oral Cyanosis] : no oral cyanosis [Oropharynx] : The oropharynx was normal [Neck Appearance] : the appearance of the neck was normal [] : no respiratory distress [Respiration, Rhythm And Depth] : normal respiratory rhythm and effort [Exaggerated Use Of Accessory Muscles For Inspiration] : no accessory muscle use [Auscultation Breath Sounds / Voice Sounds] : lungs were clear to auscultation bilaterally [Heart Rate And Rhythm] : heart rate was normal and rhythm regular [Heart Sounds] : normal S1 and S2 [Murmurs] : no murmurs [Edema] : there was no peripheral edema [Bowel Sounds] : normal bowel sounds [Abdomen Soft] : soft [Abdomen Tenderness] : non-tender [Cervical Lymph Nodes Enlarged Posterior Bilaterally] : posterior cervical [Cervical Lymph Nodes Enlarged Anterior Bilaterally] : anterior cervical [Supraclavicular Lymph Nodes Enlarged Bilaterally] : supraclavicular [Abnormal Walk] : normal gait [Nail Clubbing] : no clubbing  or cyanosis of the fingernails [Involuntary Movements] : no involuntary movements were seen [No Focal Deficits] : no focal deficits [Affect] : the affect was normal [Mood] : the mood was normal [Memory Recent] : recent memory was not impaired [Memory Remote] : remote memory was not impaired [FreeTextEntry1] : dry skin

## 2020-02-26 NOTE — ASSESSMENT
[FreeTextEntry1] : htn: c/w current lisinopril, bp controlled\par thrombocytopenia: easy bruising. follow up labwork\par anxiety and depression: c/w sertraline, f/u with dr. pineda and psychologist\par asthma: controlled . c/w advair 100/50 \par hx of humerous fracture: dexa, check vit D\par hcm: mammogram, fecal occult, and dexa\par check bloodwork\par

## 2020-02-26 NOTE — HISTORY OF PRESENT ILLNESS
[FreeTextEntry1] : 68 y/o F w/ history of COPD, HCV s/p treatment, chronic thrombocytopenia, Depression, Anxiety presents for follow up for htn and itching\par \par denies lightheadedness, dizziness, shortness of breath, or palpitations. adherent with medications\par \par Hep C: treated; following with Dr. Canas, hepatology.\par Anxiety/depression: mood has been stable. Follows with Dr. Whitehead. Takes sertraline. Rarely uses ativan for anxiety.\par itchy skin\par easy bruising but denies bleeding\par dry scratchy throat:  \par adviar \par htn: lisinopril 10mg for bp\par allergic to dogs, but has dog, occasionally takes allergy pill.\par \par poor hearing: reports had hearing test in the past.\par

## 2020-05-14 ENCOUNTER — APPOINTMENT (OUTPATIENT)
Dept: OPHTHALMOLOGY | Facility: CLINIC | Age: 70
End: 2020-05-14

## 2020-08-11 ENCOUNTER — APPOINTMENT (OUTPATIENT)
Dept: GERIATRICS | Facility: CLINIC | Age: 70
End: 2020-08-11
Payer: MEDICARE

## 2020-08-11 ENCOUNTER — APPOINTMENT (OUTPATIENT)
Dept: GERIATRICS | Facility: CLINIC | Age: 70
End: 2020-08-11

## 2020-08-11 VITALS
OXYGEN SATURATION: 98 % | WEIGHT: 108 LBS | SYSTOLIC BLOOD PRESSURE: 142 MMHG | HEIGHT: 62 IN | DIASTOLIC BLOOD PRESSURE: 76 MMHG | HEART RATE: 1 BPM | RESPIRATION RATE: 16 BRPM | BODY MASS INDEX: 19.88 KG/M2 | TEMPERATURE: 97.9 F

## 2020-08-11 DIAGNOSIS — L29.9 PRURITUS, UNSPECIFIED: ICD-10-CM

## 2020-08-11 DIAGNOSIS — Z86.79 PERSONAL HISTORY OF OTHER DISEASES OF THE CIRCULATORY SYSTEM: ICD-10-CM

## 2020-08-11 DIAGNOSIS — Z87.891 PERSONAL HISTORY OF NICOTINE DEPENDENCE: ICD-10-CM

## 2020-08-11 DIAGNOSIS — Z87.09 PERSONAL HISTORY OF OTHER DISEASES OF THE RESPIRATORY SYSTEM: ICD-10-CM

## 2020-08-11 DIAGNOSIS — Z86.19 PERSONAL HISTORY OF OTHER INFECTIOUS AND PARASITIC DISEASES: ICD-10-CM

## 2020-08-11 DIAGNOSIS — Z82.49 FAMILY HISTORY OF ISCHEMIC HEART DISEASE AND OTHER DISEASES OF THE CIRCULATORY SYSTEM: ICD-10-CM

## 2020-08-11 DIAGNOSIS — R23.8 OTHER SKIN CHANGES: ICD-10-CM

## 2020-08-11 PROCEDURE — 99214 OFFICE O/P EST MOD 30 MIN: CPT | Mod: GC

## 2020-08-14 PROBLEM — Z87.891 FORMER SMOKER: Status: ACTIVE | Noted: 2017-03-30

## 2020-08-24 ENCOUNTER — APPOINTMENT (OUTPATIENT)
Dept: GERIATRICS | Facility: CLINIC | Age: 70
End: 2020-08-24

## 2020-08-26 PROBLEM — L29.9 PRURITUS: Status: ACTIVE | Noted: 2018-05-18

## 2020-08-26 PROBLEM — R23.8 EASY BRUISABILITY: Status: ACTIVE | Noted: 2020-08-11

## 2020-08-26 NOTE — HISTORY OF PRESENT ILLNESS
[FreeTextEntry1] : 70 year old elderly woman with known medical  hx of asthma/COPD, Anxiety/Depression , HTN, Hep C s/p Treatment , chronic thrombocytopenia , hx of TBI with associated memory impairment , osteoporosis with hx of humeral fracture. \par She presented today with complaints of sore throat, easy bruising and itchiness. She denies associated cough , fever , sick contact, dysphagia to solid or liquid, voice change, dyspnea , orthopnea , PND. \par Easy bruising with associated ecchymosis- has known hx of thrombocytopenia, occasional use of aspirin , no active bleed , no hemoptysis, hematemesis, melena or FRBPR. \par review of other systems negative.

## 2020-08-26 NOTE — REVIEW OF SYSTEMS
[Fever] : no fever [Chills] : no chills [Feeling Poorly] : not feeling poorly [Eye Pain] : no eye pain [Eyesight Problems] : no eyesight problems [Discharge From Eyes] : no purulent discharge from the eyes [Red Eyes] : eyes not red [Eyes Itch] : no itching of the eyes [Dry Eyes] : no dryness of the eyes [Nosebleeds] : no nosebleeds [Nasal Discharge] : no nasal discharge [Earache] : no earache [Hoarseness] : no hoarseness [Heart Rate Is Slow] : the heart rate was not slow [Heart Rate Is Fast] : the heart rate was not fast [Chest Pain] : no chest pain [Palpitations] : no palpitations [Leg Claudication] : no intermittent leg claudication [Lower Ext Edema] : no lower extremity edema [Shortness Of Breath] : no shortness of breath [Wheezing] : no wheezing [Cough] : no cough [SOB on Exertion] : no shortness of breath during exertion [Orthopnea] : no orthopnea [PND] : no PND [Abdominal Pain] : no abdominal pain [Vomiting] : no vomiting [Constipation] : no constipation [Diarrhea] : no diarrhea [Heartburn] : no heartburn [Melena] : no melena [Dysuria] : no dysuria [Incontinence] : no incontinence [Pelvic Pain] : no pelvic pain [Dysmenorrhea] : no dysmenorrhea [Vaginal Discharge] : no vaginal discharge [Abn Vaginal Bleeding] : no unexplained vaginal bleeding [Arthralgias] : no arthralgias [Joint Swelling] : no joint swelling [Joint Pain] : no joint pain [Joint Stiffness] : no joint stiffness [Limb Pain] : no limb pain [Limb Swelling] : no limb swelling [Skin Lesions] : no skin lesions [Skin Wound] : no skin wound [Itching] : no itching [Change In A Mole] : no change in a mole [Breast Pain] : no breast pain [Breast Lump] : no breast lump [Confused] : no confusion [Convulsions] : no convulsions [Dizziness] : no dizziness [Fainting] : no fainting [Limb Weakness] : no limb weakness [Difficulty Walking] : no difficulty walking [Suicidal] : not suicidal [Sleep Disturbances] : no sleep disturbances [Anxiety] : no anxiety [Change In Personality] : no personality change [Emotional Problems] : no emotional problems [Proptosis] : no proptosis [Hot Flashes] : no hot flashes [Muscle Weakness] : no muscle weakness [Deepening Of The Voice] : no deepening of the voice [Feelings Of Weakness] : no feelings of weakness [Easy Bleeding] : no tendency for easy bleeding [Swollen Glands] : no swollen glands [Swollen Glands In The Neck] : no swollen glands in the neck [FreeTextEntry4] : Hard of hearing

## 2020-08-26 NOTE — ASSESSMENT
[FreeTextEntry1] : 1. Sore Throat \par Likely Viral - no tonsilar exudates , no swelling , no palpable LN \par Centor Score - 0 \par Patient advised to Cepacol or Chloraseptic spray for relief \par Hot tea with Honey advised \par Adequate hydration \par \par 2. Itching \par Patient c/o of generalized body itching , mostly in her back , not rash of note. \par Advised to avoid skin drying : less shower , moisturizer , \par Adequate hydration \par Avoid allergens\par Topical Benadryl can be used if intolerable - OTC \par Advised NO ORAL BENADRYL \par \par 3. Easy bruisability \par Patient with some ecchymotic skin in the leg and forearm . \par She has known hx of thrombocytopenia and Hep C - treated. \par she has pending blood work including CBC, CMP-  lab orders given to patient today. \par She reports occasional use of Aspirin- advised for no aspirin use. \par \par 4. HTN\par Known HTN , BP controlled on Lisinopril 40mg \par Last BMP in May 2019- K  - 5.5 \par Needs repeat testing to evaluate use of ACE-I \par \par 5. Osteoporosis\par Last DEXA in 2013 - \par Needs repeat DEXA \par Prolia as per Endo \par Ca+Vit D supplement \par Fall prevention \par \par 6. Health Maintenance \par Routing labs ordered - to do today \par DEXA Scan \par FIT testing \par Mammogram \par Geriatric assessment during next visit \par close follow up appt with PMD

## 2020-08-26 NOTE — ADDENDUM
[FreeTextEntry1] : 69 y/o woman seen for an acute visit for sore throat, also having itching and bruising. Pt of Dr. Aldana.\par Hx includes asthma/COPD, anxiety/depression, HTN, hep C s/p treatment, thrombocytopenia, TBI with some cognitive impairment, and osteoporosis.\par \par Sore throat- pt not sure when started. Denies any PND. No ear pain or other upper resp sx.\par Does rinse mouth after inhaler use.\par \par Itching, not localized, all over. Does shower daily. Tries to moisturize. Due for labs. hep C was treated.\par \par Easy bruising, known low plt, not on ASA.\par \par On exam, pleasant woman, NAD, sitting comfortably. Afebrile, /76, O2 98%\par Ears with normal clear tympanic membranes\par Some nasal turbinate inflammation\par Throat with minimal erythema, no exudates, NO THRUSH\par Lungs clear\par Heart regular\par No skin rashes seen\par Some ecchymoses\par \par A+P:\par 1. Sore throat, not bacterial, likely viral\par -symptomatic control with cepachol losenges/chloraseptic spray, tea with honey, hydration\par -cont to rinse mouth after inhaler use, no evidence thrush\par \par 2. Pruritus- due for labs which we reprinted (ordered in Feb but didn't get to do with pandemic)--check LFTs to make sure not bilirubin related, symptomatic relief with benadryl CREAM (not oral), can be partly dry skin--recommended moisturize, no hot showers, try and shower every other day rather than daily\par \par 3. Bruising- due for CBC to check platelet count\par \par Rest as above, recommended she RTC to see Dr. Aldana within the month after getting labs done.\par \par

## 2020-09-29 ENCOUNTER — LABORATORY RESULT (OUTPATIENT)
Age: 70
End: 2020-09-29

## 2020-10-02 RX ORDER — MULTIVIT-MIN/FOLIC/VIT K/LYCOP 400-300MCG
25 MCG TABLET ORAL DAILY
Qty: 90 | Refills: 3 | Status: ACTIVE | COMMUNITY
Start: 2020-10-02 | End: 1900-01-01

## 2020-12-23 PROBLEM — Z87.09 HISTORY OF SORE THROAT: Status: RESOLVED | Noted: 2020-08-11 | Resolved: 2020-12-23

## 2021-01-01 ENCOUNTER — NON-APPOINTMENT (OUTPATIENT)
Age: 71
End: 2021-01-01

## 2021-01-01 ENCOUNTER — APPOINTMENT (OUTPATIENT)
Dept: GERIATRICS | Facility: CLINIC | Age: 71
End: 2021-01-01
Payer: MEDICARE

## 2021-01-01 VITALS
SYSTOLIC BLOOD PRESSURE: 118 MMHG | HEART RATE: 73 BPM | BODY MASS INDEX: 20.79 KG/M2 | WEIGHT: 110.13 LBS | TEMPERATURE: 97.3 F | HEIGHT: 61 IN | DIASTOLIC BLOOD PRESSURE: 70 MMHG | OXYGEN SATURATION: 98 % | RESPIRATION RATE: 16 BRPM

## 2021-01-01 VITALS
HEIGHT: 61 IN | BODY MASS INDEX: 20.6 KG/M2 | WEIGHT: 109.13 LBS | SYSTOLIC BLOOD PRESSURE: 152 MMHG | DIASTOLIC BLOOD PRESSURE: 60 MMHG | HEART RATE: 73 BPM | TEMPERATURE: 97.6 F | RESPIRATION RATE: 16 BRPM | OXYGEN SATURATION: 98 %

## 2021-01-01 DIAGNOSIS — D69.6 THROMBOCYTOPENIA, UNSPECIFIED: ICD-10-CM

## 2021-01-01 DIAGNOSIS — R41.3 OTHER AMNESIA: ICD-10-CM

## 2021-01-01 DIAGNOSIS — B19.20 UNSPECIFIED VIRAL HEPATITIS C W/OUT HEPATIC COMA: ICD-10-CM

## 2021-01-01 DIAGNOSIS — I10 ESSENTIAL (PRIMARY) HYPERTENSION: ICD-10-CM

## 2021-01-01 DIAGNOSIS — F32.A DEPRESSION, UNSPECIFIED: ICD-10-CM

## 2021-01-01 DIAGNOSIS — Z01.818 ENCOUNTER FOR OTHER PREPROCEDURAL EXAMINATION: ICD-10-CM

## 2021-01-01 DIAGNOSIS — H26.9 UNSPECIFIED CATARACT: ICD-10-CM

## 2021-01-01 DIAGNOSIS — J44.9 CHRONIC OBSTRUCTIVE PULMONARY DISEASE, UNSPECIFIED: ICD-10-CM

## 2021-01-01 DIAGNOSIS — E55.9 VITAMIN D DEFICIENCY, UNSPECIFIED: ICD-10-CM

## 2021-01-01 LAB
25(OH)D3 SERPL-MCNC: 33.5 NG/ML
ALBUMIN SERPL ELPH-MCNC: 4 G/DL
ALP BLD-CCNC: 110 U/L
ALT SERPL-CCNC: 14 U/L
ANION GAP SERPL CALC-SCNC: 11 MMOL/L
AST SERPL-CCNC: 27 U/L
BASOPHILS # BLD AUTO: 0.06 K/UL
BASOPHILS NFR BLD AUTO: 1.3 %
BILIRUB SERPL-MCNC: 0.8 MG/DL
BUN SERPL-MCNC: 24 MG/DL
CALCIUM SERPL-MCNC: 9.9 MG/DL
CHLORIDE SERPL-SCNC: 104 MMOL/L
CHOLEST SERPL-MCNC: 171 MG/DL
CO2 SERPL-SCNC: 26 MMOL/L
CREAT SERPL-MCNC: 0.92 MG/DL
EOSINOPHIL # BLD AUTO: 0.13 K/UL
EOSINOPHIL NFR BLD AUTO: 2.7 %
ESTIMATED AVERAGE GLUCOSE: 105 MG/DL
FERRITIN SERPL-MCNC: 195 NG/ML
FOLATE SERPL-MCNC: 13.8 NG/ML
GLUCOSE SERPL-MCNC: 95 MG/DL
HBA1C MFR BLD HPLC: 5.3 %
HCT VFR BLD CALC: 40.7 %
HDLC SERPL-MCNC: 70 MG/DL
HGB BLD-MCNC: 13.4 G/DL
IMM GRANULOCYTES NFR BLD AUTO: 0.2 %
INR PPP: 1.14 RATIO
IRON SATN MFR SERPL: 25 %
IRON SERPL-MCNC: 76 UG/DL
LDLC SERPL CALC-MCNC: 90 MG/DL
LYMPHOCYTES # BLD AUTO: 1.1 K/UL
LYMPHOCYTES NFR BLD AUTO: 23.3 %
MAN DIFF?: NORMAL
MCHC RBC-ENTMCNC: 31.2 PG
MCHC RBC-ENTMCNC: 32.9 GM/DL
MCV RBC AUTO: 94.9 FL
MONOCYTES # BLD AUTO: 0.46 K/UL
MONOCYTES NFR BLD AUTO: 9.7 %
NEUTROPHILS # BLD AUTO: 2.97 K/UL
NEUTROPHILS NFR BLD AUTO: 62.8 %
NONHDLC SERPL-MCNC: 102 MG/DL
PLATELET # BLD AUTO: 74 K/UL
POTASSIUM SERPL-SCNC: 4.5 MMOL/L
PROT SERPL-MCNC: 6.7 G/DL
PT BLD: 13.4 SEC
RBC # BLD: 4.29 M/UL
RBC # FLD: 13.8 %
SODIUM SERPL-SCNC: 141 MMOL/L
TIBC SERPL-MCNC: 311 UG/DL
TRIGL SERPL-MCNC: 58 MG/DL
TSH SERPL-ACNC: 1.01 UIU/ML
UIBC SERPL-MCNC: 235 UG/DL
VIT B12 SERPL-MCNC: 645 PG/ML
WBC # FLD AUTO: 4.73 K/UL

## 2021-01-01 PROCEDURE — 99214 OFFICE O/P EST MOD 30 MIN: CPT | Mod: 25

## 2021-01-01 PROCEDURE — 93000 ELECTROCARDIOGRAM COMPLETE: CPT

## 2021-01-01 PROCEDURE — 99214 OFFICE O/P EST MOD 30 MIN: CPT

## 2021-01-01 RX ORDER — LISINOPRIL 5 MG/1
5 TABLET ORAL
Qty: 90 | Refills: 3 | Status: DISCONTINUED | COMMUNITY
Start: 2019-04-29 | End: 2021-01-01

## 2021-03-29 ENCOUNTER — NON-APPOINTMENT (OUTPATIENT)
Age: 71
End: 2021-03-29

## 2021-04-19 ENCOUNTER — APPOINTMENT (OUTPATIENT)
Dept: GERIATRICS | Facility: CLINIC | Age: 71
End: 2021-04-19
Payer: MEDICARE

## 2021-04-19 VITALS
TEMPERATURE: 96.7 F | RESPIRATION RATE: 14 BRPM | HEIGHT: 62 IN | DIASTOLIC BLOOD PRESSURE: 70 MMHG | OXYGEN SATURATION: 98 % | BODY MASS INDEX: 20.08 KG/M2 | SYSTOLIC BLOOD PRESSURE: 118 MMHG | HEART RATE: 80 BPM | WEIGHT: 109.13 LBS

## 2021-04-19 DIAGNOSIS — R39.89 OTHER SYMPTOMS AND SIGNS INVOLVING THE GENITOURINARY SYSTEM: ICD-10-CM

## 2021-04-19 DIAGNOSIS — M81.0 AGE-RELATED OSTEOPOROSIS W/OUT CURRENT PATHOLOGICAL FRACTURE: ICD-10-CM

## 2021-04-19 DIAGNOSIS — Z12.39 ENCOUNTER FOR OTHER SCREENING FOR MALIGNANT NEOPLASM OF BREAST: ICD-10-CM

## 2021-04-19 DIAGNOSIS — F41.9 ANXIETY DISORDER, UNSPECIFIED: ICD-10-CM

## 2021-04-19 PROCEDURE — 99214 OFFICE O/P EST MOD 30 MIN: CPT

## 2021-04-19 RX ORDER — FLUTICASONE PROPIONATE AND SALMETEROL 100; 50 UG/1; UG/1
100-50 POWDER RESPIRATORY (INHALATION)
Qty: 1 | Refills: 0 | Status: DISCONTINUED | COMMUNITY
Start: 2017-12-07 | End: 2021-04-19

## 2021-04-19 RX ORDER — FLUTICASONE PROPIONATE AND SALMETEROL 100; 50 UG/1; UG/1
100-50 POWDER RESPIRATORY (INHALATION)
Qty: 1 | Refills: 4 | Status: DISCONTINUED | COMMUNITY
Start: 2020-02-26 | End: 2021-04-19

## 2021-04-19 RX ORDER — ZOSTER VACCINE RECOMBINANT, ADJUVANTED 50 MCG/0.5
50 KIT INTRAMUSCULAR
Qty: 1 | Refills: 0 | Status: DISCONTINUED | OUTPATIENT
Start: 2018-05-17 | End: 2021-04-19

## 2021-04-19 RX ORDER — SERTRALINE HYDROCHLORIDE 50 MG/1
50 TABLET, FILM COATED ORAL
Qty: 30 | Refills: 0 | Status: DISCONTINUED | COMMUNITY
Start: 2021-04-06

## 2021-04-19 RX ORDER — FLUTICASONE PROPIONATE AND SALMETEROL 100; 50 UG/1; UG/1
100-50 POWDER RESPIRATORY (INHALATION)
Qty: 1 | Refills: 0 | Status: DISCONTINUED | COMMUNITY
Start: 2020-07-11 | End: 2021-04-19

## 2021-04-19 NOTE — ASSESSMENT
[FreeTextEntry1] : htn: lower lisinopril to 5mg daily\par thrombocytopenia: easy bruising. follow up labwork\par anxiety and depression: c/w sertraline,asking for  psychologist\par asthma/COPD: advised trial off wixela\par hx of humerous fracture:  check vit D\par check bloodwork prior to next visit\par \par

## 2021-04-19 NOTE — HISTORY OF PRESENT ILLNESS
[FreeTextEntry1] : 72 y/o F w/ history HTN, HCV s/p treatment, chronic thrombocytopenia, Depression, Anxiety presents for follow up.\par \par complains of lightheadedness and  dizziness worse when standing.\par \par Hep C: treated; following with Dr. Canas, hepatology.\par Anxiety/depression: no longer seeing psychiatrist.  used to see Dr. Whitehead. Takes sertraline. Rarely uses ativan for anxiety. 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 With severe obstruction with an excellent response to Bronchodilators. \par only using wixela once daily, denies coughing, shortness of breath, wheezing.  notes it is expensive.\par \par hx of traumatic brain injury with some memory loss\par independent in ADL's/IADL's

## 2021-05-19 ENCOUNTER — APPOINTMENT (OUTPATIENT)
Dept: PULMONOLOGY | Facility: CLINIC | Age: 71
End: 2021-05-19
Payer: MEDICARE

## 2021-05-19 VITALS
WEIGHT: 105 LBS | OXYGEN SATURATION: 96 % | HEIGHT: 62 IN | RESPIRATION RATE: 15 BRPM | TEMPERATURE: 97.5 F | DIASTOLIC BLOOD PRESSURE: 78 MMHG | BODY MASS INDEX: 19.32 KG/M2 | SYSTOLIC BLOOD PRESSURE: 150 MMHG | HEART RATE: 81 BPM

## 2021-05-19 DIAGNOSIS — Z87.898 PERSONAL HISTORY OF OTHER SPECIFIED CONDITIONS: ICD-10-CM

## 2021-05-19 DIAGNOSIS — Z87.891 PERSONAL HISTORY OF NICOTINE DEPENDENCE: ICD-10-CM

## 2021-05-19 PROCEDURE — 99214 OFFICE O/P EST MOD 30 MIN: CPT

## 2021-05-19 RX ORDER — FLUTICASONE PROPIONATE AND SALMETEROL 250; 50 UG/1; UG/1
250-50 POWDER RESPIRATORY (INHALATION)
Qty: 1 | Refills: 0 | Status: DISCONTINUED | COMMUNITY
Start: 2021-05-19 | End: 2021-05-19

## 2021-05-19 NOTE — HISTORY OF PRESENT ILLNESS
[FreeTextEntry1] : 70 y/o with a past medical history of of HCV s/p treatment, chronic thrombocytopenia, Depression, Anxiety, former Smoker quit in her 18.She does not recall how much she smoked at the time.  Marijuana user current 2 times per week. Her  also uses marijuana a few times per week. \par She also had a lot of second hand exposure from her mom growing up and her .  \par Her PFTs show severe obstruction with an excellent response to Bronchodilators but persistence of some obstruction nevertheless.   At initial visit she had RAST panel sent which was positive to cat and dog dander and dust.  IgE was normal.  she was given Advair, spiriva and proair rescue inhaler.  she has a cat at home- long haired cat that sleeps in her bedroom.  We talked about limiting her access to the cat and trying to keep home as clean as possible.  However also told that the only effective measure is removing cat entirely from home.  \par \par She returns for followup.  States that she is feeling much better. She removed carpeting from her home.  still feels somewhat sob at home, but is fine when outside.also experiencing sneezing.  she is not using a rescue inhaler, has not been using spiriva.  \par \par At last visit, Advair was ordered alpha one level checked and found to be WNL\par \par Returns for follow up today:\par She is feeling well.  She denies shortness of breath, cough or wheezing. She no longer has her cat and feels better.  She does have a dog for which she tested mildly allergic in the past.\par \par Her PFTs have shown severe obstruction in the past with markedly positive bronchodilator response.  \par \par Using Wixela only once daily.  Does not use a rescue inhaler.

## 2021-05-19 NOTE — PHYSICAL EXAM
[No Acute Distress] : no acute distress [Normal Appearance] : normal appearance [No Neck Mass] : no neck mass [Normal Rate/Rhythm] : normal rate/rhythm [Normal S1, S2] : normal s1, s2 [No Murmurs] : no murmurs [No Resp Distress] : no resp distress [Clear to Auscultation Bilaterally] : clear to auscultation bilaterally [No Abnormalities] : no abnormalities [Normal Gait] : normal gait [No Clubbing] : no clubbing [No Edema] : no edema [Normal Color/ Pigmentation] : normal color/ pigmentation [No Focal Deficits] : no focal deficits

## 2021-05-19 NOTE — ASSESSMENT
[FreeTextEntry1] : 70 yo woman  with asthma/COPD, allergy to cat, now with a dog at home.  PFTs at initial visit in 2017 showed severe obstruction with good bronchodilator response but without complete reversibility. I have not seen her since 2018.  She has developed difficulty hearing and is haviing difficulty hearing me today.  She has been using Wixela once daily.  Denies any breathing difficulty currently. \par \par On PE today VSS, lungs are clear without wheezes.  She appears well.  \par \par IMpression:  Asthma/COPD overlap.  Allergy to cats.  Previously PFTs with obstruction and marked bronchodilator response.  \par \par Plan:\par \par Advised continued use of Wixela 100/50 twice daily (ordered for her)\par PFTs ordered\par F/U after that.

## 2021-06-05 ENCOUNTER — APPOINTMENT (OUTPATIENT)
Dept: DISASTER EMERGENCY | Facility: CLINIC | Age: 71
End: 2021-06-05

## 2021-06-08 ENCOUNTER — APPOINTMENT (OUTPATIENT)
Dept: PULMONOLOGY | Facility: CLINIC | Age: 71
End: 2021-06-08

## 2021-06-15 ENCOUNTER — NON-APPOINTMENT (OUTPATIENT)
Age: 71
End: 2021-06-15

## 2021-06-21 ENCOUNTER — APPOINTMENT (OUTPATIENT)
Dept: DISASTER EMERGENCY | Facility: CLINIC | Age: 71
End: 2021-06-21

## 2021-06-24 ENCOUNTER — APPOINTMENT (OUTPATIENT)
Dept: PULMONOLOGY | Facility: CLINIC | Age: 71
End: 2021-06-24
Payer: MEDICARE

## 2021-06-24 VITALS
TEMPERATURE: 97 F | RESPIRATION RATE: 15 BRPM | HEART RATE: 76 BPM | SYSTOLIC BLOOD PRESSURE: 148 MMHG | OXYGEN SATURATION: 95 % | DIASTOLIC BLOOD PRESSURE: 84 MMHG

## 2021-06-24 VITALS
HEIGHT: 61.4 IN | BODY MASS INDEX: 19.94 KG/M2 | OXYGEN SATURATION: 98 % | WEIGHT: 107 LBS | HEART RATE: 82 BPM | TEMPERATURE: 97.2 F

## 2021-06-24 DIAGNOSIS — J30.81 ALLERGIC RHINITIS DUE TO ANIMAL (CAT) (DOG) HAIR AND DANDER: ICD-10-CM

## 2021-06-24 PROCEDURE — 99214 OFFICE O/P EST MOD 30 MIN: CPT | Mod: 25

## 2021-06-24 PROCEDURE — ZZZZZ: CPT

## 2021-06-24 PROCEDURE — 94726 PLETHYSMOGRAPHY LUNG VOLUMES: CPT

## 2021-06-24 PROCEDURE — 94010 BREATHING CAPACITY TEST: CPT

## 2021-06-24 PROCEDURE — 94729 DIFFUSING CAPACITY: CPT

## 2021-06-24 NOTE — HISTORY OF PRESENT ILLNESS
[FreeTextEntry1] : 72 y/o with a past medical history of of HCV s/p treatment, chronic thrombocytopenia, Depression, Anxiety, former Smoker quit in her 18.She does not recall how much she smoked at the time.  Marijuana user current 2 times per week. Her  also uses marijuana a few times per week. \par She also had a lot of second hand exposure from her mom growing up and her .  \par Her PFTs show severe obstruction with an excellent response to Bronchodilators but persistence of some obstruction nevertheless.   At initial visit she had RAST panel sent which was positive to cat and dog dander and dust.  IgE was normal.  she was given Advair, spiriva and proair rescue inhaler.  she has a cat at home- long haired cat that sleeps in her bedroom.  We talked about limiting her access to the cat and trying to keep home as clean as possible.  However also told that the only effective measure is removing cat entirely from home.  \par \par Since initial visit she has removed carpeting from her home and got rid of her cat.  She is much better.  \par alpha one level checked and found to be WNL\par \par Returns for follow up today:\par Using Wixela twice daily.  Does not use a rescue inhaler.  Feels well.  Denies breathing difficulty cough or wheezing.\par Last PFTs were performed today:\par \par FEV1 and FVC are normal but ratio is reduced at 57.  Lung volumes and DLCO are WNL.  These are improved when compared with prior PFT where ratio was 42.

## 2021-06-24 NOTE — ASSESSMENT
[FreeTextEntry1] : 72 yo woman  with asthma/COPD, allergy to cat, now with a dog at home.  PFTs at initial visit in 2017 showed severe obstruction with good bronchodilator response but without complete reversibility.  \par Currently using Wixela twice daily.  NO longer has cat at home.  FEels well without breathing difficulty.\par \par PFTs today still show mild obstruction but ratio is markedly improved.  \par \par On PE today VSS, lungs are clear without wheezes.  She appears well.  \par \par IMpression:  Asthma/COPD overlap.  Allergy to cats.  Previously PFTs with obstruction and marked bronchodilator response.  \par \par Plan:\par \par Continue Wixela 100/50 twice daily (ordered for her)\par F/U in 6 months.

## 2021-09-16 NOTE — PROCEDURE
Patient called and stated that her toe is bleeding and pussing ans requesting bactrim without sulfa! She's allergic to it.         [facility] [address] [address]   Phone:  [phone]  Fax:  [phone]      Please call and advise [de-identified] : sling placed to left arm

## 2021-09-20 NOTE — HISTORY OF PRESENT ILLNESS
[No falls in past year] : Patient reported no falls in the past year [FreeTextEntry1] : 72 y/o F w/ history HTN, HCV s/p treatment, chronic thrombocytopenia, Depression, Anxiety presents for follow up.\par without acute complaints today\par \par Hep C: treated; following with Dr. Canas, hepatology.\par Anxiety/depression: no longer seeing psychiatrist.  used to see Dr. Whitehead. Takes sertraline. Rarely uses Ativan for anxiety. 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 With severe obstruction with an excellent response to Bronchodilators. 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

## 2021-09-20 NOTE — PHYSICAL EXAM
[General Appearance - Alert] : alert [General Appearance - In No Acute Distress] : in no acute distress [General Appearance - Well-Appearing] : healthy appearing [Sclera] : the sclera and conjunctiva were normal [Extraocular Movements] : extraocular movements were intact [No Oral Pallor] : no oral pallor [No Oral Cyanosis] : no oral cyanosis [Oropharynx] : The oropharynx was normal [Neck Appearance] : the appearance of the neck was normal [] : no respiratory distress [Exaggerated Use Of Accessory Muscles For Inspiration] : no accessory muscle use [Respiration, Rhythm And Depth] : normal respiratory rhythm and effort [Auscultation Breath Sounds / Voice Sounds] : lungs were clear to auscultation bilaterally [Heart Rate And Rhythm] : heart rate was normal and rhythm regular [Heart Sounds] : normal S1 and S2 [Edema] : there was no peripheral edema [Bowel Sounds] : normal bowel sounds [Abdomen Tenderness] : non-tender [Abdomen Soft] : soft [Cervical Lymph Nodes Enlarged Posterior Bilaterally] : posterior cervical [Cervical Lymph Nodes Enlarged Anterior Bilaterally] : anterior cervical [Supraclavicular Lymph Nodes Enlarged Bilaterally] : supraclavicular [Nail Clubbing] : no clubbing  or cyanosis of the fingernails [Abnormal Walk] : normal gait [Involuntary Movements] : no involuntary movements were seen [No Focal Deficits] : no focal deficits [Affect] : the affect was normal [Mood] : the mood was normal [Memory Recent] : recent memory was not impaired [Memory Remote] : remote memory was not impaired [FreeTextEntry1] : dry skin

## 2021-09-20 NOTE — ASSESSMENT
[FreeTextEntry1] : htn:c/w losartan, suspect some white coat hypertension, advised to take home bp log\par thrombocytopenia: likely ITP,  easy bruising. follow up labwork , \par anxiety and depression: c/w sertraline\par asthma/COPD: c.w wixela\par hx of left humerus fracture: have discussed OT, but she is declining, she will continue to home stretching\par HCV: get records from DR. Dawit Canas Hepatologist.\par [phone]\par -last labs we have from 2017: showing negative not detected HCV rna\par \par \par

## 2021-10-13 PROBLEM — J44.9 ASTHMA-CHRONIC OBSTRUCTIVE PULMONARY DISEASE OVERLAP SYNDROME: Status: ACTIVE | Noted: 2017-12-07

## 2021-10-13 PROBLEM — Z01.818 PREOP TESTING: Status: ACTIVE | Noted: 2021-06-03

## 2021-10-13 PROBLEM — H26.9 CATARACT, BILATERAL: Status: ACTIVE | Noted: 2021-01-01

## 2021-10-13 NOTE — HISTORY OF PRESENT ILLNESS
[Preoperative Visit] : for a medical evaluation prior to surgery [Scheduled Procedure ___] : a [unfilled] [Date of Surgery ___] : on [unfilled] [Surgeon Name ___] : surgeon: [unfilled] [Good] : Good [Fever] : no fever [Chills] : no chills [Fatigue] : no fatigue [Chest Pain] : no chest pain [Cough] : no cough [Dysuria] : no dysuria [Urinary Frequency] : no urinary frequency [Nausea] : no nausea [Vomiting] : no vomiting [Diarrhea] : no diarrhea [Abdominal Pain] : no abdominal pain [Diabetes] : no diabetes [Anti-Platelet Agents] : no anti-platelet agents [Nicotine Dependence] : no nicotine dependence [Alcohol Use] : no  alcohol use [Renal Disease] : no renal disease [GI Disease] : no gastrointestinal disease [Clotting Disorder] : no clotting disorder [Frequent use of NSAIDs] : no use of NSAIDs [Frequent Aspirin Use] : no frequent aspirin use [de-identified] : DR. Giuliano Valenzuela  phone: [phone] [FreeTextEntry1] : 72 y/o F w/ history HTN, HCV s/p treatment, chronic thrombocytopenia, Depression, Anxiety presents for preop clearance\par \par planning to have left eye cataract removal on 11/8/21\par \par Hep C: treated; following with Dr. Cnaas, hepatology.\par Anxiety/depression: no longer seeing psychiatrist. used to see Dr. Whitehead. Takes sertraline. Rarely uses Ativan for anxiety. \par \par left shoulder reduced rom, she tries to do exercises\par \par thrombocytopenia: easy bruising but denies bleeding\par \par htn:on losartan, denies lightheadedness, dizziness, shortness of breath, or palpitations.  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 With severe obstruction with an excellent response to Bronchodilators. 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

## 2021-10-13 NOTE — ASSESSMENT
[Procedure Low Risk] : the procedure risk is low [Patient Low Risk] : the patient is a low surgical risk [Optimized for Surgery] : the patient is optimized for surgery [Continue] : Continue medications as currently directed [FreeTextEntry1] : EKG today showing NSR at rate 70 without ischemic changes.  labwork from sept reviewed and at baseline.  Patient is medically optimized to proceed with right and left cataract surgeries.  \par \par fax preop to DR. Villalobos fax [phone]\par and [facility] fax [phone]\par \par htn:c/w losartan, well controlled\par thrombocytopenia: likely ITP, easy bruising. has been stable ~70, referral provided for heme\par anxiety and depression: c/w sertraline\par asthma/COPD: c.w wixela\par hx of left humerus fracture: have discussed OT, but she is declining, she will continue to home stretching\par HCV: get records from DR. Dawit Canas Hepatologist.she  will be making a follow up visit\par [phone]\par -last labs we have from 2017: showing negative not detected HCV rna\par \par

## 2021-10-13 NOTE — PHYSICAL EXAM
[Normal Appearance] : the appearance of the neck was normal [Edema] : edema was not present [No Clubbing, Cyanosis] : no clubbing or cyanosis of the fingernails [Involuntary Movements] : no involuntary movements were seen [Motor Tone] : muscle strength and tone were normal [No Focal Deficits] : no focal deficits [Normal] : normal affect and normal mood

## 2022-01-01 ENCOUNTER — INPATIENT (INPATIENT)
Facility: HOSPITAL | Age: 72
LOS: 7 days | DRG: 207 | End: 2022-08-14
Attending: INTERNAL MEDICINE | Admitting: THORACIC SURGERY (CARDIOTHORACIC VASCULAR SURGERY)
Payer: MEDICARE

## 2022-01-01 ENCOUNTER — OUTPATIENT (OUTPATIENT)
Dept: OUTPATIENT SERVICES | Facility: HOSPITAL | Age: 72
LOS: 1 days | End: 2022-01-01

## 2022-01-01 ENCOUNTER — TRANSCRIPTION ENCOUNTER (OUTPATIENT)
Age: 72
End: 2022-01-01

## 2022-01-01 ENCOUNTER — APPOINTMENT (OUTPATIENT)
Dept: GERIATRICS | Facility: CLINIC | Age: 72
End: 2022-01-01

## 2022-01-01 ENCOUNTER — INPATIENT (INPATIENT)
Facility: HOSPITAL | Age: 72
LOS: 0 days | Discharge: ANOTHER HOSPITAL | End: 2022-08-06

## 2022-01-01 ENCOUNTER — RESULT REVIEW (OUTPATIENT)
Age: 72
End: 2022-01-01

## 2022-01-01 VITALS — HEART RATE: 79 BPM | OXYGEN SATURATION: 100 %

## 2022-01-01 DIAGNOSIS — N17.9 ACUTE KIDNEY FAILURE, UNSPECIFIED: ICD-10-CM

## 2022-01-01 DIAGNOSIS — R04.2 HEMOPTYSIS: ICD-10-CM

## 2022-01-01 DIAGNOSIS — B19.20 UNSPECIFIED VIRAL HEPATITIS C WITHOUT HEPATIC COMA: ICD-10-CM

## 2022-01-01 DIAGNOSIS — J45.909 UNSPECIFIED ASTHMA, UNCOMPLICATED: ICD-10-CM

## 2022-01-01 DIAGNOSIS — D69.6 THROMBOCYTOPENIA, UNSPECIFIED: ICD-10-CM

## 2022-01-01 DIAGNOSIS — K92.2 GASTROINTESTINAL HEMORRHAGE, UNSPECIFIED: ICD-10-CM

## 2022-01-01 DIAGNOSIS — Z20.822 CONTACT WITH AND (SUSPECTED) EXPOSURE TO COVID-19: ICD-10-CM

## 2022-01-01 DIAGNOSIS — J96.01 ACUTE RESPIRATORY FAILURE WITH HYPOXIA: ICD-10-CM

## 2022-01-01 DIAGNOSIS — I10 ESSENTIAL (PRIMARY) HYPERTENSION: ICD-10-CM

## 2022-01-01 DIAGNOSIS — K74.60 UNSPECIFIED CIRRHOSIS OF LIVER: ICD-10-CM

## 2022-01-01 DIAGNOSIS — F32.A DEPRESSION, UNSPECIFIED: ICD-10-CM

## 2022-01-01 DIAGNOSIS — F07.81 POSTCONCUSSIONAL SYNDROME: ICD-10-CM

## 2022-01-01 DIAGNOSIS — Z87.891 PERSONAL HISTORY OF NICOTINE DEPENDENCE: ICD-10-CM

## 2022-01-01 DIAGNOSIS — J18.9 PNEUMONIA, UNSPECIFIED ORGANISM: ICD-10-CM

## 2022-01-01 LAB
-  AMIKACIN: SIGNIFICANT CHANGE UP
-  AMIKACIN: SIGNIFICANT CHANGE UP
-  AMOXICILLIN/CLAVULANIC ACID: SIGNIFICANT CHANGE UP
-  AMOXICILLIN/CLAVULANIC ACID: SIGNIFICANT CHANGE UP
-  AMPICILLIN/SULBACTAM: SIGNIFICANT CHANGE UP
-  AMPICILLIN/SULBACTAM: SIGNIFICANT CHANGE UP
-  AMPICILLIN: SIGNIFICANT CHANGE UP
-  AMPICILLIN: SIGNIFICANT CHANGE UP
-  AZTREONAM: SIGNIFICANT CHANGE UP
-  AZTREONAM: SIGNIFICANT CHANGE UP
-  CEFAZOLIN: SIGNIFICANT CHANGE UP
-  CEFAZOLIN: SIGNIFICANT CHANGE UP
-  CEFEPIME: SIGNIFICANT CHANGE UP
-  CEFEPIME: SIGNIFICANT CHANGE UP
-  CEFOXITIN: SIGNIFICANT CHANGE UP
-  CEFOXITIN: SIGNIFICANT CHANGE UP
-  CEFTRIAXONE: SIGNIFICANT CHANGE UP
-  CEFTRIAXONE: SIGNIFICANT CHANGE UP
-  CIPROFLOXACIN: SIGNIFICANT CHANGE UP
-  CIPROFLOXACIN: SIGNIFICANT CHANGE UP
-  ERTAPENEM: SIGNIFICANT CHANGE UP
-  ERTAPENEM: SIGNIFICANT CHANGE UP
-  GENTAMICIN: SIGNIFICANT CHANGE UP
-  GENTAMICIN: SIGNIFICANT CHANGE UP
-  IMIPENEM: SIGNIFICANT CHANGE UP
-  IMIPENEM: SIGNIFICANT CHANGE UP
-  LEVOFLOXACIN: SIGNIFICANT CHANGE UP
-  LEVOFLOXACIN: SIGNIFICANT CHANGE UP
-  MEROPENEM: SIGNIFICANT CHANGE UP
-  MEROPENEM: SIGNIFICANT CHANGE UP
-  PIPERACILLIN/TAZOBACTAM: SIGNIFICANT CHANGE UP
-  PIPERACILLIN/TAZOBACTAM: SIGNIFICANT CHANGE UP
-  TOBRAMYCIN: SIGNIFICANT CHANGE UP
-  TOBRAMYCIN: SIGNIFICANT CHANGE UP
-  TRIMETHOPRIM/SULFAMETHOXAZOLE: SIGNIFICANT CHANGE UP
-  TRIMETHOPRIM/SULFAMETHOXAZOLE: SIGNIFICANT CHANGE UP
A1C WITH ESTIMATED AVERAGE GLUCOSE RESULT: 5.2 % — SIGNIFICANT CHANGE UP (ref 4–5.6)
ABO RH CONFIRMATION: SIGNIFICANT CHANGE UP
ALBUMIN SERPL ELPH-MCNC: 1.8 G/DL — LOW (ref 3.3–5.2)
ALBUMIN SERPL ELPH-MCNC: 1.9 G/DL — LOW (ref 3.3–5.2)
ALBUMIN SERPL ELPH-MCNC: 2 G/DL — LOW (ref 3.3–5.2)
ALBUMIN SERPL ELPH-MCNC: 2 G/DL — LOW (ref 3.3–5.2)
ALBUMIN SERPL ELPH-MCNC: 2.1 G/DL — LOW (ref 3.3–5.2)
ALBUMIN SERPL ELPH-MCNC: 2.4 G/DL — LOW (ref 3.3–5.2)
ALP SERPL-CCNC: 105 U/L — SIGNIFICANT CHANGE UP (ref 40–120)
ALP SERPL-CCNC: 62 U/L — SIGNIFICANT CHANGE UP (ref 40–120)
ALP SERPL-CCNC: 69 U/L — SIGNIFICANT CHANGE UP (ref 40–120)
ALP SERPL-CCNC: 85 U/L — SIGNIFICANT CHANGE UP (ref 40–120)
ALP SERPL-CCNC: 89 U/L — SIGNIFICANT CHANGE UP (ref 40–120)
ALP SERPL-CCNC: 97 U/L — SIGNIFICANT CHANGE UP (ref 40–120)
ALT FLD-CCNC: 105 U/L — HIGH
ALT FLD-CCNC: 25 U/L — SIGNIFICANT CHANGE UP
ALT FLD-CCNC: 28 U/L — SIGNIFICANT CHANGE UP
ALT FLD-CCNC: 57 U/L — HIGH
ALT FLD-CCNC: 61 U/L — HIGH
ALT FLD-CCNC: 80 U/L — HIGH
AMMONIA BLD-MCNC: 28 UMOL/L — SIGNIFICANT CHANGE UP (ref 11–55)
AMMONIA BLD-MCNC: 567 UMOL/L — HIGH (ref 11–55)
AMMONIA BLD-MCNC: 62 UMOL/L — HIGH (ref 11–55)
AMMONIA BLD-MCNC: 81 UMOL/L — HIGH (ref 11–55)
AMYLASE P1 CFR SERPL: 43 U/L — SIGNIFICANT CHANGE UP (ref 36–128)
ANION GAP SERPL CALC-SCNC: 10 MMOL/L — SIGNIFICANT CHANGE UP (ref 5–17)
ANION GAP SERPL CALC-SCNC: 11 MMOL/L — SIGNIFICANT CHANGE UP (ref 5–17)
ANION GAP SERPL CALC-SCNC: 12 MMOL/L — SIGNIFICANT CHANGE UP (ref 5–17)
ANION GAP SERPL CALC-SCNC: 13 MMOL/L — SIGNIFICANT CHANGE UP (ref 5–17)
ANION GAP SERPL CALC-SCNC: 14 MMOL/L — SIGNIFICANT CHANGE UP (ref 5–17)
ANION GAP SERPL CALC-SCNC: 14 MMOL/L — SIGNIFICANT CHANGE UP (ref 5–17)
ANISOCYTOSIS BLD QL: SLIGHT — SIGNIFICANT CHANGE UP
APPEARANCE UR: CLEAR — SIGNIFICANT CHANGE UP
APTT BLD: 30.2 SEC — SIGNIFICANT CHANGE UP (ref 27.5–35.5)
APTT BLD: 34.1 SEC — SIGNIFICANT CHANGE UP (ref 27.5–35.5)
APTT BLD: 36.5 SEC — HIGH (ref 27.5–35.5)
APTT BLD: 37.3 SEC — HIGH (ref 27.5–35.5)
AST SERPL-CCNC: 104 U/L — HIGH
AST SERPL-CCNC: 34 U/L — HIGH
AST SERPL-CCNC: 35 U/L — HIGH
AST SERPL-CCNC: 45 U/L — HIGH
AST SERPL-CCNC: 53 U/L — HIGH
AST SERPL-CCNC: 79 U/L — HIGH
BACTERIA # UR AUTO: ABNORMAL
BACTERIA # UR AUTO: ABNORMAL
BASE EXCESS BLDA CALC-SCNC: -4.5 MMOL/L — LOW (ref -2–3)
BASE EXCESS BLDA CALC-SCNC: -8.5 MMOL/L — LOW (ref -2–3)
BASOPHILS # BLD AUTO: 0 K/UL — SIGNIFICANT CHANGE UP (ref 0–0.2)
BASOPHILS # BLD AUTO: 0 K/UL — SIGNIFICANT CHANGE UP (ref 0–0.2)
BASOPHILS # BLD AUTO: 0.03 K/UL — SIGNIFICANT CHANGE UP (ref 0–0.2)
BASOPHILS # BLD AUTO: 0.06 K/UL — SIGNIFICANT CHANGE UP (ref 0–0.2)
BASOPHILS NFR BLD AUTO: 0 % — SIGNIFICANT CHANGE UP (ref 0–2)
BASOPHILS NFR BLD AUTO: 0 % — SIGNIFICANT CHANGE UP (ref 0–2)
BASOPHILS NFR BLD AUTO: 0.4 % — SIGNIFICANT CHANGE UP (ref 0–2)
BASOPHILS NFR BLD AUTO: 0.9 % — SIGNIFICANT CHANGE UP (ref 0–2)
BILIRUB DIRECT SERPL-MCNC: 2.3 MG/DL — HIGH (ref 0–0.3)
BILIRUB INDIRECT FLD-MCNC: 0.5 MG/DL — SIGNIFICANT CHANGE UP (ref 0.2–1)
BILIRUB SERPL-MCNC: 2.8 MG/DL — HIGH (ref 0.4–2)
BILIRUB SERPL-MCNC: 2.8 MG/DL — HIGH (ref 0.4–2)
BILIRUB SERPL-MCNC: 3 MG/DL — HIGH (ref 0.4–2)
BILIRUB SERPL-MCNC: 3 MG/DL — HIGH (ref 0.4–2)
BILIRUB SERPL-MCNC: 3.2 MG/DL — HIGH (ref 0.4–2)
BILIRUB SERPL-MCNC: 3.3 MG/DL — HIGH (ref 0.4–2)
BILIRUB SERPL-MCNC: 3.9 MG/DL — HIGH (ref 0.4–2)
BILIRUB UR-MCNC: ABNORMAL
BILIRUB UR-MCNC: NEGATIVE — SIGNIFICANT CHANGE UP
BILIRUB UR-MCNC: NEGATIVE — SIGNIFICANT CHANGE UP
BLD GP AB SCN SERPL QL: SIGNIFICANT CHANGE UP
BLD GP AB SCN SERPL QL: SIGNIFICANT CHANGE UP
BLOOD GAS COMMENTS ARTERIAL: SIGNIFICANT CHANGE UP
BLOOD GAS COMMENTS ARTERIAL: SIGNIFICANT CHANGE UP
BUN SERPL-MCNC: 105.2 MG/DL — HIGH (ref 8–20)
BUN SERPL-MCNC: 64.1 MG/DL — HIGH (ref 8–20)
BUN SERPL-MCNC: 73.1 MG/DL — HIGH (ref 8–20)
BUN SERPL-MCNC: 74.2 MG/DL — HIGH (ref 8–20)
BUN SERPL-MCNC: 74.9 MG/DL — HIGH (ref 8–20)
BUN SERPL-MCNC: 75 MG/DL — HIGH (ref 8–20)
BUN SERPL-MCNC: 78.6 MG/DL — HIGH (ref 8–20)
BUN SERPL-MCNC: 80.7 MG/DL — HIGH (ref 8–20)
BUN SERPL-MCNC: 82 MG/DL — HIGH (ref 8–20)
BUN SERPL-MCNC: 82.6 MG/DL — HIGH (ref 8–20)
BUN SERPL-MCNC: 88.1 MG/DL — HIGH (ref 8–20)
BUN SERPL-MCNC: 93.2 MG/DL — HIGH (ref 8–20)
BUN SERPL-MCNC: 99.5 MG/DL — HIGH (ref 8–20)
BURR CELLS BLD QL SMEAR: PRESENT — SIGNIFICANT CHANGE UP
BURR CELLS BLD QL SMEAR: PRESENT — SIGNIFICANT CHANGE UP
CALCIUM SERPL-MCNC: 6.3 MG/DL — CRITICAL LOW (ref 8.4–10.5)
CALCIUM SERPL-MCNC: 6.5 MG/DL — CRITICAL LOW (ref 8.4–10.5)
CALCIUM SERPL-MCNC: 6.6 MG/DL — LOW (ref 8.4–10.5)
CALCIUM SERPL-MCNC: 6.8 MG/DL — LOW (ref 8.4–10.5)
CALCIUM SERPL-MCNC: 6.9 MG/DL — LOW (ref 8.4–10.5)
CALCIUM SERPL-MCNC: 7 MG/DL — LOW (ref 8.4–10.5)
CALCIUM SERPL-MCNC: 7 MG/DL — LOW (ref 8.4–10.5)
CALCIUM SERPL-MCNC: 7.1 MG/DL — LOW (ref 8.4–10.5)
CALCIUM SERPL-MCNC: 7.3 MG/DL — LOW (ref 8.4–10.5)
CALCIUM SERPL-MCNC: 7.8 MG/DL — LOW (ref 8.4–10.5)
CALCIUM SERPL-MCNC: 7.8 MG/DL — LOW (ref 8.4–10.5)
CHLORIDE SERPL-SCNC: 106 MMOL/L — SIGNIFICANT CHANGE UP (ref 98–107)
CHLORIDE SERPL-SCNC: 109 MMOL/L — HIGH (ref 98–107)
CHLORIDE SERPL-SCNC: 109 MMOL/L — HIGH (ref 98–107)
CHLORIDE SERPL-SCNC: 110 MMOL/L — HIGH (ref 98–107)
CHLORIDE SERPL-SCNC: 113 MMOL/L — HIGH (ref 98–107)
CHLORIDE SERPL-SCNC: 113 MMOL/L — HIGH (ref 98–107)
CHLORIDE SERPL-SCNC: 114 MMOL/L — HIGH (ref 98–107)
CHLORIDE SERPL-SCNC: 114 MMOL/L — HIGH (ref 98–107)
CHLORIDE SERPL-SCNC: 115 MMOL/L — HIGH (ref 98–107)
CHLORIDE SERPL-SCNC: 116 MMOL/L — HIGH (ref 98–107)
CHLORIDE SERPL-SCNC: 116 MMOL/L — HIGH (ref 98–107)
CHLORIDE SERPL-SCNC: 117 MMOL/L — HIGH (ref 98–107)
CHLORIDE SERPL-SCNC: 117 MMOL/L — HIGH (ref 98–107)
CK SERPL-CCNC: 51 U/L — SIGNIFICANT CHANGE UP (ref 25–170)
CK SERPL-CCNC: 93 U/L — SIGNIFICANT CHANGE UP (ref 25–170)
CO2 SERPL-SCNC: 17 MMOL/L — LOW (ref 22–29)
CO2 SERPL-SCNC: 18 MMOL/L — LOW (ref 22–29)
CO2 SERPL-SCNC: 19 MMOL/L — LOW (ref 22–29)
CO2 SERPL-SCNC: 20 MMOL/L — LOW (ref 22–29)
CO2 SERPL-SCNC: 21 MMOL/L — LOW (ref 22–29)
COLOR SPEC: YELLOW — SIGNIFICANT CHANGE UP
CREAT SERPL-MCNC: 1.26 MG/DL — SIGNIFICANT CHANGE UP (ref 0.5–1.3)
CREAT SERPL-MCNC: 1.48 MG/DL — HIGH (ref 0.5–1.3)
CREAT SERPL-MCNC: 1.5 MG/DL — HIGH (ref 0.5–1.3)
CREAT SERPL-MCNC: 1.51 MG/DL — HIGH (ref 0.5–1.3)
CREAT SERPL-MCNC: 1.56 MG/DL — HIGH (ref 0.5–1.3)
CREAT SERPL-MCNC: 1.59 MG/DL — HIGH (ref 0.5–1.3)
CREAT SERPL-MCNC: 1.6 MG/DL — HIGH (ref 0.5–1.3)
CREAT SERPL-MCNC: 1.6 MG/DL — HIGH (ref 0.5–1.3)
CREAT SERPL-MCNC: 1.69 MG/DL — HIGH (ref 0.5–1.3)
CREAT SERPL-MCNC: 1.88 MG/DL — HIGH (ref 0.5–1.3)
CREAT SERPL-MCNC: 2.01 MG/DL — HIGH (ref 0.5–1.3)
CREAT SERPL-MCNC: 2.18 MG/DL — HIGH (ref 0.5–1.3)
CREAT SERPL-MCNC: 2.24 MG/DL — HIGH (ref 0.5–1.3)
CULTURE RESULTS: SIGNIFICANT CHANGE UP
DIFF PNL FLD: ABNORMAL
EGFR: 22 ML/MIN/1.73M2 — LOW
EGFR: 23 ML/MIN/1.73M2 — LOW
EGFR: 26 ML/MIN/1.73M2 — LOW
EGFR: 28 ML/MIN/1.73M2 — LOW
EGFR: 32 ML/MIN/1.73M2 — LOW
EGFR: 34 ML/MIN/1.73M2 — LOW
EGFR: 35 ML/MIN/1.73M2 — LOW
EGFR: 36 ML/MIN/1.73M2 — LOW
EGFR: 36 ML/MIN/1.73M2 — LOW
EGFR: 37 ML/MIN/1.73M2 — LOW
EGFR: 45 ML/MIN/1.73M2 — LOW
ELLIPTOCYTES BLD QL SMEAR: SLIGHT — SIGNIFICANT CHANGE UP
EOSINOPHIL # BLD AUTO: 0 K/UL — SIGNIFICANT CHANGE UP (ref 0–0.5)
EOSINOPHIL # BLD AUTO: 0.01 K/UL — SIGNIFICANT CHANGE UP (ref 0–0.5)
EOSINOPHIL NFR BLD AUTO: 0 % — SIGNIFICANT CHANGE UP (ref 0–6)
EOSINOPHIL NFR BLD AUTO: 0.1 % — SIGNIFICANT CHANGE UP (ref 0–6)
EPI CELLS # UR: SIGNIFICANT CHANGE UP
ESTIMATED AVERAGE GLUCOSE: 103 MG/DL — SIGNIFICANT CHANGE UP (ref 68–114)
GAS PNL BLDA: SIGNIFICANT CHANGE UP
GGT SERPL-CCNC: 22 U/L — SIGNIFICANT CHANGE UP (ref 8–40)
GIANT PLATELETS BLD QL SMEAR: PRESENT — SIGNIFICANT CHANGE UP
GLUCOSE BLDC GLUCOMTR-MCNC: 100 MG/DL — HIGH (ref 70–99)
GLUCOSE BLDC GLUCOMTR-MCNC: 101 MG/DL — HIGH (ref 70–99)
GLUCOSE BLDC GLUCOMTR-MCNC: 109 MG/DL — HIGH (ref 70–99)
GLUCOSE BLDC GLUCOMTR-MCNC: 113 MG/DL — HIGH (ref 70–99)
GLUCOSE BLDC GLUCOMTR-MCNC: 115 MG/DL — HIGH (ref 70–99)
GLUCOSE BLDC GLUCOMTR-MCNC: 118 MG/DL — HIGH (ref 70–99)
GLUCOSE BLDC GLUCOMTR-MCNC: 142 MG/DL — HIGH (ref 70–99)
GLUCOSE BLDC GLUCOMTR-MCNC: 155 MG/DL — HIGH (ref 70–99)
GLUCOSE BLDC GLUCOMTR-MCNC: 158 MG/DL — HIGH (ref 70–99)
GLUCOSE BLDC GLUCOMTR-MCNC: 164 MG/DL — HIGH (ref 70–99)
GLUCOSE BLDC GLUCOMTR-MCNC: 166 MG/DL — HIGH (ref 70–99)
GLUCOSE BLDC GLUCOMTR-MCNC: 171 MG/DL — HIGH (ref 70–99)
GLUCOSE BLDC GLUCOMTR-MCNC: 186 MG/DL — HIGH (ref 70–99)
GLUCOSE BLDC GLUCOMTR-MCNC: 191 MG/DL — HIGH (ref 70–99)
GLUCOSE BLDC GLUCOMTR-MCNC: 191 MG/DL — HIGH (ref 70–99)
GLUCOSE BLDC GLUCOMTR-MCNC: 213 MG/DL — HIGH (ref 70–99)
GLUCOSE BLDC GLUCOMTR-MCNC: 248 MG/DL — HIGH (ref 70–99)
GLUCOSE BLDC GLUCOMTR-MCNC: 46 MG/DL — CRITICAL LOW (ref 70–99)
GLUCOSE BLDC GLUCOMTR-MCNC: 46 MG/DL — CRITICAL LOW (ref 70–99)
GLUCOSE BLDC GLUCOMTR-MCNC: 88 MG/DL — SIGNIFICANT CHANGE UP (ref 70–99)
GLUCOSE BLDC GLUCOMTR-MCNC: 92 MG/DL — SIGNIFICANT CHANGE UP (ref 70–99)
GLUCOSE BLDC GLUCOMTR-MCNC: 93 MG/DL — SIGNIFICANT CHANGE UP (ref 70–99)
GLUCOSE BLDC GLUCOMTR-MCNC: 94 MG/DL — SIGNIFICANT CHANGE UP (ref 70–99)
GLUCOSE BLDC GLUCOMTR-MCNC: 97 MG/DL — SIGNIFICANT CHANGE UP (ref 70–99)
GLUCOSE BLDC GLUCOMTR-MCNC: <30 MG/DL — CRITICAL LOW (ref 70–99)
GLUCOSE BLDC GLUCOMTR-MCNC: <30 MG/DL — CRITICAL LOW (ref 70–99)
GLUCOSE SERPL-MCNC: 105 MG/DL — HIGH (ref 70–99)
GLUCOSE SERPL-MCNC: 109 MG/DL — HIGH (ref 70–99)
GLUCOSE SERPL-MCNC: 109 MG/DL — HIGH (ref 70–99)
GLUCOSE SERPL-MCNC: 110 MG/DL — HIGH (ref 70–99)
GLUCOSE SERPL-MCNC: 110 MG/DL — HIGH (ref 70–99)
GLUCOSE SERPL-MCNC: 118 MG/DL — HIGH (ref 70–99)
GLUCOSE SERPL-MCNC: 125 MG/DL — HIGH (ref 70–99)
GLUCOSE SERPL-MCNC: 134 MG/DL — HIGH (ref 70–99)
GLUCOSE SERPL-MCNC: 160 MG/DL — HIGH (ref 70–99)
GLUCOSE SERPL-MCNC: 170 MG/DL — HIGH (ref 70–99)
GLUCOSE SERPL-MCNC: 192 MG/DL — HIGH (ref 70–99)
GLUCOSE SERPL-MCNC: 207 MG/DL — HIGH (ref 70–99)
GLUCOSE SERPL-MCNC: 255 MG/DL — HIGH (ref 70–99)
GLUCOSE UR QL: NEGATIVE MG/DL — SIGNIFICANT CHANGE UP
GLUCOSE UR QL: NEGATIVE MG/DL — SIGNIFICANT CHANGE UP
GLUCOSE UR QL: NEGATIVE — SIGNIFICANT CHANGE UP
GRAM STN FLD: SIGNIFICANT CHANGE UP
GRAM STN FLD: SIGNIFICANT CHANGE UP
HAPTOGLOB SERPL-MCNC: 34 MG/DL — SIGNIFICANT CHANGE UP (ref 34–200)
HCO3 BLDA-SCNC: 17 MMOL/L — LOW (ref 21–28)
HCO3 BLDA-SCNC: 20 MMOL/L — LOW (ref 21–28)
HCT VFR BLD CALC: 29.2 % — LOW (ref 34.5–45)
HCT VFR BLD CALC: 30.8 % — LOW (ref 34.5–45)
HCT VFR BLD CALC: 31 % — LOW (ref 34.5–45)
HCT VFR BLD CALC: 32.9 % — LOW (ref 34.5–45)
HCT VFR BLD CALC: 33.3 % — LOW (ref 34.5–45)
HCT VFR BLD CALC: 33.3 % — LOW (ref 34.5–45)
HCT VFR BLD CALC: 33.5 % — LOW (ref 34.5–45)
HCT VFR BLD CALC: 34.2 % — LOW (ref 34.5–45)
HCT VFR BLD CALC: 34.2 % — LOW (ref 34.5–45)
HCT VFR BLD CALC: 34.5 % — SIGNIFICANT CHANGE UP (ref 34.5–45)
HCT VFR BLD CALC: 34.9 % — SIGNIFICANT CHANGE UP (ref 34.5–45)
HCT VFR BLD CALC: 35.2 % — SIGNIFICANT CHANGE UP (ref 34.5–45)
HCT VFR BLD CALC: 36.2 % — SIGNIFICANT CHANGE UP (ref 34.5–45)
HCV AB S/CO SERPL IA: 6.9 S/CO — HIGH (ref 0–0.99)
HCV AB SERPL-IMP: REACTIVE
HCV RNA FLD QL NAA+PROBE: SIGNIFICANT CHANGE UP
HCV RNA SPEC QL PROBE+SIG AMP: SIGNIFICANT CHANGE UP
HGB BLD-MCNC: 10.2 G/DL — LOW (ref 11.5–15.5)
HGB BLD-MCNC: 10.5 G/DL — LOW (ref 11.5–15.5)
HGB BLD-MCNC: 10.5 G/DL — LOW (ref 11.5–15.5)
HGB BLD-MCNC: 10.8 G/DL — LOW (ref 11.5–15.5)
HGB BLD-MCNC: 10.9 G/DL — LOW (ref 11.5–15.5)
HGB BLD-MCNC: 11.3 G/DL — LOW (ref 11.5–15.5)
HGB BLD-MCNC: 11.4 G/DL — LOW (ref 11.5–15.5)
HGB BLD-MCNC: 11.6 G/DL — SIGNIFICANT CHANGE UP (ref 11.5–15.5)
HGB BLD-MCNC: 11.7 G/DL — SIGNIFICANT CHANGE UP (ref 11.5–15.5)
HGB BLD-MCNC: 11.8 G/DL — SIGNIFICANT CHANGE UP (ref 11.5–15.5)
HGB BLD-MCNC: 11.8 G/DL — SIGNIFICANT CHANGE UP (ref 11.5–15.5)
HGB BLD-MCNC: 9.6 G/DL — LOW (ref 11.5–15.5)
HGB BLD-MCNC: 9.6 G/DL — LOW (ref 11.5–15.5)
HOROWITZ INDEX BLDA+IHG-RTO: 40 — SIGNIFICANT CHANGE UP
HOROWITZ INDEX BLDA+IHG-RTO: 50 — SIGNIFICANT CHANGE UP
HYPOCHROMIA BLD QL: SLIGHT — SIGNIFICANT CHANGE UP
IMM GRANULOCYTES NFR BLD AUTO: 1.5 % — SIGNIFICANT CHANGE UP (ref 0–1.5)
INR BLD: 1.2 RATIO — HIGH (ref 0.88–1.16)
INR BLD: 1.22 RATIO — HIGH (ref 0.88–1.16)
INR BLD: 1.23 RATIO — HIGH (ref 0.88–1.16)
INR BLD: 1.26 RATIO — HIGH (ref 0.88–1.16)
KETONES UR-MCNC: ABNORMAL
LACTATE SERPL-SCNC: 3.5 MMOL/L — HIGH (ref 0.5–2)
LACTATE SERPL-SCNC: 3.8 MMOL/L — HIGH (ref 0.5–2)
LACTATE SERPL-SCNC: 3.8 MMOL/L — HIGH (ref 0.5–2)
LACTATE SERPL-SCNC: 5.1 MMOL/L — CRITICAL HIGH (ref 0.5–2)
LACTATE SERPL-SCNC: 5.4 MMOL/L — CRITICAL HIGH (ref 0.5–2)
LEUKOCYTE ESTERASE UR-ACNC: ABNORMAL
LIDOCAIN IGE QN: 57 U/L — HIGH (ref 22–51)
LYMPHOCYTES # BLD AUTO: 0.13 K/UL — LOW (ref 1–3.3)
LYMPHOCYTES # BLD AUTO: 0.81 K/UL — LOW (ref 1–3.3)
LYMPHOCYTES # BLD AUTO: 1.04 K/UL — SIGNIFICANT CHANGE UP (ref 1–3.3)
LYMPHOCYTES # BLD AUTO: 1.12 K/UL — SIGNIFICANT CHANGE UP (ref 1–3.3)
LYMPHOCYTES # BLD AUTO: 1.8 % — LOW (ref 13–44)
LYMPHOCYTES # BLD AUTO: 12.6 % — LOW (ref 13–44)
LYMPHOCYTES # BLD AUTO: 12.7 % — LOW (ref 13–44)
LYMPHOCYTES # BLD AUTO: 14.6 % — SIGNIFICANT CHANGE UP (ref 13–44)
MACROCYTES BLD QL: SLIGHT — SIGNIFICANT CHANGE UP
MAGNESIUM SERPL-MCNC: 2.5 MG/DL — SIGNIFICANT CHANGE UP (ref 1.6–2.6)
MAGNESIUM SERPL-MCNC: 2.6 MG/DL — SIGNIFICANT CHANGE UP (ref 1.6–2.6)
MAGNESIUM SERPL-MCNC: 2.7 MG/DL — HIGH (ref 1.6–2.6)
MAGNESIUM SERPL-MCNC: 2.8 MG/DL — HIGH (ref 1.6–2.6)
MAGNESIUM SERPL-MCNC: 2.9 MG/DL — HIGH (ref 1.6–2.6)
MANUAL SMEAR VERIFICATION: SIGNIFICANT CHANGE UP
MCHC RBC-ENTMCNC: 28.8 GM/DL — LOW (ref 32–36)
MCHC RBC-ENTMCNC: 29.4 PG — SIGNIFICANT CHANGE UP (ref 27–34)
MCHC RBC-ENTMCNC: 29.5 PG — SIGNIFICANT CHANGE UP (ref 27–34)
MCHC RBC-ENTMCNC: 29.7 PG — SIGNIFICANT CHANGE UP (ref 27–34)
MCHC RBC-ENTMCNC: 29.8 GM/DL — LOW (ref 32–36)
MCHC RBC-ENTMCNC: 30.1 PG — SIGNIFICANT CHANGE UP (ref 27–34)
MCHC RBC-ENTMCNC: 30.1 PG — SIGNIFICANT CHANGE UP (ref 27–34)
MCHC RBC-ENTMCNC: 30.2 PG — SIGNIFICANT CHANGE UP (ref 27–34)
MCHC RBC-ENTMCNC: 30.2 PG — SIGNIFICANT CHANGE UP (ref 27–34)
MCHC RBC-ENTMCNC: 30.3 PG — SIGNIFICANT CHANGE UP (ref 27–34)
MCHC RBC-ENTMCNC: 30.3 PG — SIGNIFICANT CHANGE UP (ref 27–34)
MCHC RBC-ENTMCNC: 30.4 PG — SIGNIFICANT CHANGE UP (ref 27–34)
MCHC RBC-ENTMCNC: 30.5 PG — SIGNIFICANT CHANGE UP (ref 27–34)
MCHC RBC-ENTMCNC: 30.7 PG — SIGNIFICANT CHANGE UP (ref 27–34)
MCHC RBC-ENTMCNC: 30.8 PG — SIGNIFICANT CHANGE UP (ref 27–34)
MCHC RBC-ENTMCNC: 31.2 GM/DL — LOW (ref 32–36)
MCHC RBC-ENTMCNC: 31.3 GM/DL — LOW (ref 32–36)
MCHC RBC-ENTMCNC: 32.9 GM/DL — SIGNIFICANT CHANGE UP (ref 32–36)
MCHC RBC-ENTMCNC: 32.9 GM/DL — SIGNIFICANT CHANGE UP (ref 32–36)
MCHC RBC-ENTMCNC: 33.1 GM/DL — SIGNIFICANT CHANGE UP (ref 32–36)
MCHC RBC-ENTMCNC: 33.3 GM/DL — SIGNIFICANT CHANGE UP (ref 32–36)
MCHC RBC-ENTMCNC: 33.5 GM/DL — SIGNIFICANT CHANGE UP (ref 32–36)
MCHC RBC-ENTMCNC: 34.2 GM/DL — SIGNIFICANT CHANGE UP (ref 32–36)
MCHC RBC-ENTMCNC: 34.5 GM/DL — SIGNIFICANT CHANGE UP (ref 32–36)
MCHC RBC-ENTMCNC: 34.8 GM/DL — SIGNIFICANT CHANGE UP (ref 32–36)
MCHC RBC-ENTMCNC: 35.1 GM/DL — SIGNIFICANT CHANGE UP (ref 32–36)
MCV RBC AUTO: 102.5 FL — HIGH (ref 80–100)
MCV RBC AUTO: 86 FL — SIGNIFICANT CHANGE UP (ref 80–100)
MCV RBC AUTO: 86.3 FL — SIGNIFICANT CHANGE UP (ref 80–100)
MCV RBC AUTO: 87.2 FL — SIGNIFICANT CHANGE UP (ref 80–100)
MCV RBC AUTO: 88.7 FL — SIGNIFICANT CHANGE UP (ref 80–100)
MCV RBC AUTO: 89.1 FL — SIGNIFICANT CHANGE UP (ref 80–100)
MCV RBC AUTO: 91.1 FL — SIGNIFICANT CHANGE UP (ref 80–100)
MCV RBC AUTO: 91.8 FL — SIGNIFICANT CHANGE UP (ref 80–100)
MCV RBC AUTO: 92.3 FL — SIGNIFICANT CHANGE UP (ref 80–100)
MCV RBC AUTO: 93.3 FL — SIGNIFICANT CHANGE UP (ref 80–100)
MCV RBC AUTO: 97.1 FL — SIGNIFICANT CHANGE UP (ref 80–100)
MCV RBC AUTO: 97.4 FL — SIGNIFICANT CHANGE UP (ref 80–100)
MCV RBC AUTO: 98.6 FL — SIGNIFICANT CHANGE UP (ref 80–100)
METAMYELOCYTES # FLD: 0.9 % — HIGH (ref 0–0)
METHOD TYPE: SIGNIFICANT CHANGE UP
METHOD TYPE: SIGNIFICANT CHANGE UP
MICROCYTES BLD QL: SLIGHT — SIGNIFICANT CHANGE UP
MONOCYTES # BLD AUTO: 0.26 K/UL — SIGNIFICANT CHANGE UP (ref 0–0.9)
MONOCYTES # BLD AUTO: 0.27 K/UL — SIGNIFICANT CHANGE UP (ref 0–0.9)
MONOCYTES # BLD AUTO: 0.32 K/UL — SIGNIFICANT CHANGE UP (ref 0–0.9)
MONOCYTES # BLD AUTO: 0.46 K/UL — SIGNIFICANT CHANGE UP (ref 0–0.9)
MONOCYTES NFR BLD AUTO: 3.6 % — SIGNIFICANT CHANGE UP (ref 2–14)
MONOCYTES NFR BLD AUTO: 3.6 % — SIGNIFICANT CHANGE UP (ref 2–14)
MONOCYTES NFR BLD AUTO: 4.2 % — SIGNIFICANT CHANGE UP (ref 2–14)
MONOCYTES NFR BLD AUTO: 6.5 % — SIGNIFICANT CHANGE UP (ref 2–14)
MRSA PCR RESULT.: SIGNIFICANT CHANGE UP
NEUTROPHILS # BLD AUTO: 5.24 K/UL — SIGNIFICANT CHANGE UP (ref 1.8–7.4)
NEUTROPHILS # BLD AUTO: 5.45 K/UL — SIGNIFICANT CHANGE UP (ref 1.8–7.4)
NEUTROPHILS # BLD AUTO: 6.92 K/UL — SIGNIFICANT CHANGE UP (ref 1.8–7.4)
NEUTROPHILS # BLD AUTO: 7.39 K/UL — SIGNIFICANT CHANGE UP (ref 1.8–7.4)
NEUTROPHILS NFR BLD AUTO: 76.9 % — SIGNIFICANT CHANGE UP (ref 43–77)
NEUTROPHILS NFR BLD AUTO: 78 % — HIGH (ref 43–77)
NEUTROPHILS NFR BLD AUTO: 81.1 % — HIGH (ref 43–77)
NEUTROPHILS NFR BLD AUTO: 94.6 % — HIGH (ref 43–77)
NEUTS BAND # BLD: 1.8 % — SIGNIFICANT CHANGE UP (ref 0–8)
NEUTS BAND # BLD: 4.2 % — SIGNIFICANT CHANGE UP (ref 0–8)
NITRITE UR-MCNC: NEGATIVE — SIGNIFICANT CHANGE UP
NITRITE UR-MCNC: NEGATIVE — SIGNIFICANT CHANGE UP
NITRITE UR-MCNC: POSITIVE
NON-GYNECOLOGICAL CYTOLOGY STUDY: SIGNIFICANT CHANGE UP
NRBC # BLD: 11 /100 WBCS — HIGH (ref 0–0)
NT-PROBNP SERPL-SCNC: 1542 PG/ML — HIGH (ref 0–300)
NT-PROBNP SERPL-SCNC: 4190 PG/ML — HIGH (ref 0–300)
ORGANISM # SPEC MICROSCOPIC CNT: SIGNIFICANT CHANGE UP
OVALOCYTES BLD QL SMEAR: SIGNIFICANT CHANGE UP
PCO2 BLDA: 29 MMHG — LOW (ref 32–35)
PCO2 BLDA: 32 MMHG — SIGNIFICANT CHANGE UP (ref 32–35)
PH BLDA: 7.34 — LOW (ref 7.35–7.45)
PH BLDA: 7.44 — SIGNIFICANT CHANGE UP (ref 7.35–7.45)
PH UR: 5 — SIGNIFICANT CHANGE UP (ref 5–8)
PHOSPHATE SERPL-MCNC: 3.7 MG/DL — SIGNIFICANT CHANGE UP (ref 2.4–4.7)
PHOSPHATE SERPL-MCNC: 3.7 MG/DL — SIGNIFICANT CHANGE UP (ref 2.4–4.7)
PHOSPHATE SERPL-MCNC: 3.8 MG/DL — SIGNIFICANT CHANGE UP (ref 2.4–4.7)
PHOSPHATE SERPL-MCNC: 3.8 MG/DL — SIGNIFICANT CHANGE UP (ref 2.4–4.7)
PHOSPHATE SERPL-MCNC: 4.8 MG/DL — HIGH (ref 2.4–4.7)
PHOSPHATE SERPL-MCNC: 5.1 MG/DL — HIGH (ref 2.4–4.7)
PHOSPHATE SERPL-MCNC: 5.7 MG/DL — HIGH (ref 2.4–4.7)
PHOSPHATE SERPL-MCNC: 6.8 MG/DL — HIGH (ref 2.4–4.7)
PHOSPHATE SERPL-MCNC: 8.9 MG/DL — HIGH (ref 2.4–4.7)
PLAT MORPH BLD: ABNORMAL
PLAT MORPH BLD: NORMAL — SIGNIFICANT CHANGE UP
PLAT MORPH BLD: NORMAL — SIGNIFICANT CHANGE UP
PLATELET # BLD AUTO: 24 K/UL — LOW (ref 150–400)
PLATELET # BLD AUTO: 28 K/UL — LOW (ref 150–400)
PLATELET # BLD AUTO: 31 K/UL — LOW (ref 150–400)
PLATELET # BLD AUTO: 34 K/UL — LOW (ref 150–400)
PLATELET # BLD AUTO: 39 K/UL — LOW (ref 150–400)
PLATELET # BLD AUTO: 40 K/UL — LOW (ref 150–400)
PLATELET # BLD AUTO: 42 K/UL — LOW (ref 150–400)
PLATELET # BLD AUTO: 44 K/UL — LOW (ref 150–400)
PLATELET # BLD AUTO: 48 K/UL — LOW (ref 150–400)
PLATELET # BLD AUTO: 54 K/UL — LOW (ref 150–400)
PLATELET # BLD AUTO: 54 K/UL — LOW (ref 150–400)
PLATELET # BLD AUTO: 62 K/UL — LOW (ref 150–400)
PLATELET # BLD AUTO: 65 K/UL — LOW (ref 150–400)
PO2 BLDA: 72 MMHG — LOW (ref 83–108)
PO2 BLDA: 77 MMHG — LOW (ref 83–108)
POIKILOCYTOSIS BLD QL AUTO: SIGNIFICANT CHANGE UP
POIKILOCYTOSIS BLD QL AUTO: SLIGHT — SIGNIFICANT CHANGE UP
POLYCHROMASIA BLD QL SMEAR: SIGNIFICANT CHANGE UP
POLYCHROMASIA BLD QL SMEAR: SLIGHT — SIGNIFICANT CHANGE UP
POTASSIUM SERPL-MCNC: 3.5 MMOL/L — SIGNIFICANT CHANGE UP (ref 3.5–5.3)
POTASSIUM SERPL-MCNC: 4 MMOL/L — SIGNIFICANT CHANGE UP (ref 3.5–5.3)
POTASSIUM SERPL-MCNC: 4.1 MMOL/L — SIGNIFICANT CHANGE UP (ref 3.5–5.3)
POTASSIUM SERPL-MCNC: 4.2 MMOL/L — SIGNIFICANT CHANGE UP (ref 3.5–5.3)
POTASSIUM SERPL-MCNC: 4.2 MMOL/L — SIGNIFICANT CHANGE UP (ref 3.5–5.3)
POTASSIUM SERPL-MCNC: 4.3 MMOL/L — SIGNIFICANT CHANGE UP (ref 3.5–5.3)
POTASSIUM SERPL-MCNC: 4.4 MMOL/L — SIGNIFICANT CHANGE UP (ref 3.5–5.3)
POTASSIUM SERPL-MCNC: 4.6 MMOL/L — SIGNIFICANT CHANGE UP (ref 3.5–5.3)
POTASSIUM SERPL-MCNC: 4.9 MMOL/L — SIGNIFICANT CHANGE UP (ref 3.5–5.3)
POTASSIUM SERPL-MCNC: 5 MMOL/L — SIGNIFICANT CHANGE UP (ref 3.5–5.3)
POTASSIUM SERPL-MCNC: 5.1 MMOL/L — SIGNIFICANT CHANGE UP (ref 3.5–5.3)
POTASSIUM SERPL-MCNC: 5.5 MMOL/L — HIGH (ref 3.5–5.3)
POTASSIUM SERPL-MCNC: 5.8 MMOL/L — HIGH (ref 3.5–5.3)
POTASSIUM SERPL-MCNC: 6.7 MMOL/L — CRITICAL HIGH (ref 3.5–5.3)
POTASSIUM SERPL-SCNC: 3.5 MMOL/L — SIGNIFICANT CHANGE UP (ref 3.5–5.3)
POTASSIUM SERPL-SCNC: 4 MMOL/L — SIGNIFICANT CHANGE UP (ref 3.5–5.3)
POTASSIUM SERPL-SCNC: 4.1 MMOL/L — SIGNIFICANT CHANGE UP (ref 3.5–5.3)
POTASSIUM SERPL-SCNC: 4.2 MMOL/L — SIGNIFICANT CHANGE UP (ref 3.5–5.3)
POTASSIUM SERPL-SCNC: 4.2 MMOL/L — SIGNIFICANT CHANGE UP (ref 3.5–5.3)
POTASSIUM SERPL-SCNC: 4.3 MMOL/L — SIGNIFICANT CHANGE UP (ref 3.5–5.3)
POTASSIUM SERPL-SCNC: 4.4 MMOL/L — SIGNIFICANT CHANGE UP (ref 3.5–5.3)
POTASSIUM SERPL-SCNC: 4.6 MMOL/L — SIGNIFICANT CHANGE UP (ref 3.5–5.3)
POTASSIUM SERPL-SCNC: 4.9 MMOL/L — SIGNIFICANT CHANGE UP (ref 3.5–5.3)
POTASSIUM SERPL-SCNC: 5 MMOL/L — SIGNIFICANT CHANGE UP (ref 3.5–5.3)
POTASSIUM SERPL-SCNC: 5.1 MMOL/L — SIGNIFICANT CHANGE UP (ref 3.5–5.3)
POTASSIUM SERPL-SCNC: 5.5 MMOL/L — HIGH (ref 3.5–5.3)
POTASSIUM SERPL-SCNC: 5.8 MMOL/L — HIGH (ref 3.5–5.3)
POTASSIUM SERPL-SCNC: 6.7 MMOL/L — CRITICAL HIGH (ref 3.5–5.3)
PREALB SERPL-MCNC: 5 MG/DL — LOW (ref 18–38)
PROT SERPL-MCNC: 5.3 G/DL — LOW (ref 6.6–8.7)
PROT SERPL-MCNC: 5.4 G/DL — LOW (ref 6.6–8.7)
PROT SERPL-MCNC: 5.4 G/DL — LOW (ref 6.6–8.7)
PROT SERPL-MCNC: 5.5 G/DL — LOW (ref 6.6–8.7)
PROT SERPL-MCNC: 5.5 G/DL — LOW (ref 6.6–8.7)
PROT SERPL-MCNC: 5.7 G/DL — LOW (ref 6.6–8.7)
PROT UR-MCNC: 15
PROT UR-MCNC: NEGATIVE — SIGNIFICANT CHANGE UP
PROT UR-MCNC: NEGATIVE — SIGNIFICANT CHANGE UP
PROTHROM AB SERPL-ACNC: 14 SEC — HIGH (ref 10.5–13.4)
PROTHROM AB SERPL-ACNC: 14.2 SEC — HIGH (ref 10.5–13.4)
PROTHROM AB SERPL-ACNC: 14.3 SEC — HIGH (ref 10.5–13.4)
PROTHROM AB SERPL-ACNC: 14.6 SEC — HIGH (ref 10.5–13.4)
RAPID RVP RESULT: SIGNIFICANT CHANGE UP
RBC # BLD: 3.13 M/UL — LOW (ref 3.8–5.2)
RBC # BLD: 3.25 M/UL — LOW (ref 3.8–5.2)
RBC # BLD: 3.36 M/UL — LOW (ref 3.8–5.2)
RBC # BLD: 3.44 M/UL — LOW (ref 3.8–5.2)
RBC # BLD: 3.57 M/UL — LOW (ref 3.8–5.2)
RBC # BLD: 3.58 M/UL — LOW (ref 3.8–5.2)
RBC # BLD: 3.61 M/UL — LOW (ref 3.8–5.2)
RBC # BLD: 3.73 M/UL — LOW (ref 3.8–5.2)
RBC # BLD: 3.8 M/UL — SIGNIFICANT CHANGE UP (ref 3.8–5.2)
RBC # BLD: 3.84 M/UL — SIGNIFICANT CHANGE UP (ref 3.8–5.2)
RBC # BLD: 3.86 M/UL — SIGNIFICANT CHANGE UP (ref 3.8–5.2)
RBC # BLD: 3.89 M/UL — SIGNIFICANT CHANGE UP (ref 3.8–5.2)
RBC # BLD: 3.92 M/UL — SIGNIFICANT CHANGE UP (ref 3.8–5.2)
RBC # FLD: 16.6 % — HIGH (ref 10.3–14.5)
RBC # FLD: 16.8 % — HIGH (ref 10.3–14.5)
RBC # FLD: 17 % — HIGH (ref 10.3–14.5)
RBC # FLD: 17.3 % — HIGH (ref 10.3–14.5)
RBC # FLD: 17.3 % — HIGH (ref 10.3–14.5)
RBC # FLD: 17.4 % — HIGH (ref 10.3–14.5)
RBC # FLD: 17.4 % — HIGH (ref 10.3–14.5)
RBC # FLD: 17.6 % — HIGH (ref 10.3–14.5)
RBC # FLD: 17.7 % — HIGH (ref 10.3–14.5)
RBC # FLD: 17.9 % — HIGH (ref 10.3–14.5)
RBC # FLD: 18.2 % — HIGH (ref 10.3–14.5)
RBC # FLD: 18.5 % — HIGH (ref 10.3–14.5)
RBC # FLD: 18.9 % — HIGH (ref 10.3–14.5)
RBC BLD AUTO: ABNORMAL
RBC BLD AUTO: NORMAL — SIGNIFICANT CHANGE UP
RBC BLD AUTO: SIGNIFICANT CHANGE UP
RBC CASTS # UR COMP ASSIST: ABNORMAL /HPF (ref 0–4)
S AUREUS DNA NOSE QL NAA+PROBE: SIGNIFICANT CHANGE UP
SAO2 % BLDA: 96.5 % — SIGNIFICANT CHANGE UP (ref 94–98)
SAO2 % BLDA: 97.7 % — SIGNIFICANT CHANGE UP (ref 94–98)
SARS-COV-2 RNA SPEC QL NAA+PROBE: SIGNIFICANT CHANGE UP
SMUDGE CELLS # BLD: PRESENT — SIGNIFICANT CHANGE UP
SODIUM SERPL-SCNC: 135 MMOL/L — SIGNIFICANT CHANGE UP (ref 135–145)
SODIUM SERPL-SCNC: 140 MMOL/L — SIGNIFICANT CHANGE UP (ref 135–145)
SODIUM SERPL-SCNC: 142 MMOL/L — SIGNIFICANT CHANGE UP (ref 135–145)
SODIUM SERPL-SCNC: 143 MMOL/L — SIGNIFICANT CHANGE UP (ref 135–145)
SODIUM SERPL-SCNC: 144 MMOL/L — SIGNIFICANT CHANGE UP (ref 135–145)
SODIUM SERPL-SCNC: 144 MMOL/L — SIGNIFICANT CHANGE UP (ref 135–145)
SODIUM SERPL-SCNC: 145 MMOL/L — SIGNIFICANT CHANGE UP (ref 135–145)
SODIUM SERPL-SCNC: 145 MMOL/L — SIGNIFICANT CHANGE UP (ref 135–145)
SODIUM SERPL-SCNC: 147 MMOL/L — HIGH (ref 135–145)
SODIUM SERPL-SCNC: 148 MMOL/L — HIGH (ref 135–145)
SODIUM UR-SCNC: <30 MMOL/L — SIGNIFICANT CHANGE UP
SP GR SPEC: 1.01 — SIGNIFICANT CHANGE UP (ref 1.01–1.02)
SP GR SPEC: 1.01 — SIGNIFICANT CHANGE UP (ref 1.01–1.02)
SP GR SPEC: 1.02 — SIGNIFICANT CHANGE UP (ref 1.01–1.02)
SPECIMEN SOURCE: SIGNIFICANT CHANGE UP
TRIGL SERPL-MCNC: 183 MG/DL — HIGH
TRIGL SERPL-MCNC: 201 MG/DL — HIGH
TROPONIN T SERPL-MCNC: <0.01 NG/ML — SIGNIFICANT CHANGE UP (ref 0–0.06)
TSH SERPL-MCNC: 0.3 UIU/ML — SIGNIFICANT CHANGE UP (ref 0.27–4.2)
UROBILINOGEN FLD QL: 4
UROBILINOGEN FLD QL: 4 MG/DL
UROBILINOGEN FLD QL: 4 MG/DL
WBC # BLD: 4.12 K/UL — SIGNIFICANT CHANGE UP (ref 3.8–10.5)
WBC # BLD: 5.58 K/UL — SIGNIFICANT CHANGE UP (ref 3.8–10.5)
WBC # BLD: 5.98 K/UL — SIGNIFICANT CHANGE UP (ref 3.8–10.5)
WBC # BLD: 6.38 K/UL — SIGNIFICANT CHANGE UP (ref 3.8–10.5)
WBC # BLD: 6.67 K/UL — SIGNIFICANT CHANGE UP (ref 3.8–10.5)
WBC # BLD: 7.04 K/UL — SIGNIFICANT CHANGE UP (ref 3.8–10.5)
WBC # BLD: 7.1 K/UL — SIGNIFICANT CHANGE UP (ref 3.8–10.5)
WBC # BLD: 7.19 K/UL — SIGNIFICANT CHANGE UP (ref 3.8–10.5)
WBC # BLD: 7.32 K/UL — SIGNIFICANT CHANGE UP (ref 3.8–10.5)
WBC # BLD: 7.48 K/UL — SIGNIFICANT CHANGE UP (ref 3.8–10.5)
WBC # BLD: 8.05 K/UL — SIGNIFICANT CHANGE UP (ref 3.8–10.5)
WBC # BLD: 8.92 K/UL — SIGNIFICANT CHANGE UP (ref 3.8–10.5)
WBC # BLD: 9.06 K/UL — SIGNIFICANT CHANGE UP (ref 3.8–10.5)
WBC # FLD AUTO: 4.12 K/UL — SIGNIFICANT CHANGE UP (ref 3.8–10.5)
WBC # FLD AUTO: 5.58 K/UL — SIGNIFICANT CHANGE UP (ref 3.8–10.5)
WBC # FLD AUTO: 5.98 K/UL — SIGNIFICANT CHANGE UP (ref 3.8–10.5)
WBC # FLD AUTO: 6.38 K/UL — SIGNIFICANT CHANGE UP (ref 3.8–10.5)
WBC # FLD AUTO: 6.67 K/UL — SIGNIFICANT CHANGE UP (ref 3.8–10.5)
WBC # FLD AUTO: 7.04 K/UL — SIGNIFICANT CHANGE UP (ref 3.8–10.5)
WBC # FLD AUTO: 7.1 K/UL — SIGNIFICANT CHANGE UP (ref 3.8–10.5)
WBC # FLD AUTO: 7.19 K/UL — SIGNIFICANT CHANGE UP (ref 3.8–10.5)
WBC # FLD AUTO: 7.32 K/UL — SIGNIFICANT CHANGE UP (ref 3.8–10.5)
WBC # FLD AUTO: 7.48 K/UL — SIGNIFICANT CHANGE UP (ref 3.8–10.5)
WBC # FLD AUTO: 8.05 K/UL — SIGNIFICANT CHANGE UP (ref 3.8–10.5)
WBC # FLD AUTO: 8.92 K/UL — SIGNIFICANT CHANGE UP (ref 3.8–10.5)
WBC # FLD AUTO: 9.06 K/UL — SIGNIFICANT CHANGE UP (ref 3.8–10.5)
WBC UR QL: ABNORMAL /HPF (ref 0–5)
WBC UR QL: SIGNIFICANT CHANGE UP /HPF (ref 0–5)
WBC UR QL: SIGNIFICANT CHANGE UP /HPF (ref 0–5)

## 2022-01-01 PROCEDURE — 85730 THROMBOPLASTIN TIME PARTIAL: CPT

## 2022-01-01 PROCEDURE — 71045 X-RAY EXAM CHEST 1 VIEW: CPT | Mod: 26,77

## 2022-01-01 PROCEDURE — 71250 CT THORAX DX C-: CPT

## 2022-01-01 PROCEDURE — 99232 SBSQ HOSP IP/OBS MODERATE 35: CPT

## 2022-01-01 PROCEDURE — 74176 CT ABD & PELVIS W/O CONTRAST: CPT

## 2022-01-01 PROCEDURE — 82150 ASSAY OF AMYLASE: CPT

## 2022-01-01 PROCEDURE — 83735 ASSAY OF MAGNESIUM: CPT

## 2022-01-01 PROCEDURE — 85018 HEMOGLOBIN: CPT

## 2022-01-01 PROCEDURE — 99291 CRITICAL CARE FIRST HOUR: CPT

## 2022-01-01 PROCEDURE — 87040 BLOOD CULTURE FOR BACTERIA: CPT

## 2022-01-01 PROCEDURE — 85610 PROTHROMBIN TIME: CPT

## 2022-01-01 PROCEDURE — 99291 CRITICAL CARE FIRST HOUR: CPT | Mod: 25

## 2022-01-01 PROCEDURE — 82435 ASSAY OF BLOOD CHLORIDE: CPT

## 2022-01-01 PROCEDURE — 83010 ASSAY OF HAPTOGLOBIN QUANT: CPT

## 2022-01-01 PROCEDURE — 93010 ELECTROCARDIOGRAM REPORT: CPT

## 2022-01-01 PROCEDURE — C1889: CPT

## 2022-01-01 PROCEDURE — 88112 CYTOPATH CELL ENHANCE TECH: CPT | Mod: 26

## 2022-01-01 PROCEDURE — 82140 ASSAY OF AMMONIA: CPT

## 2022-01-01 PROCEDURE — 88305 TISSUE EXAM BY PATHOLOGIST: CPT

## 2022-01-01 PROCEDURE — 71275 CT ANGIOGRAPHY CHEST: CPT | Mod: 26

## 2022-01-01 PROCEDURE — 83690 ASSAY OF LIPASE: CPT

## 2022-01-01 PROCEDURE — 93005 ELECTROCARDIOGRAM TRACING: CPT

## 2022-01-01 PROCEDURE — 99285 EMERGENCY DEPT VISIT HI MDM: CPT

## 2022-01-01 PROCEDURE — 90937 HEMODIALYSIS REPEATED EVAL: CPT

## 2022-01-01 PROCEDURE — 84300 ASSAY OF URINE SODIUM: CPT

## 2022-01-01 PROCEDURE — U0005: CPT

## 2022-01-01 PROCEDURE — 84134 ASSAY OF PREALBUMIN: CPT

## 2022-01-01 PROCEDURE — 76770 US EXAM ABDO BACK WALL COMP: CPT | Mod: 26

## 2022-01-01 PROCEDURE — 86803 HEPATITIS C AB TEST: CPT

## 2022-01-01 PROCEDURE — 88305 TISSUE EXAM BY PATHOLOGIST: CPT | Mod: 26

## 2022-01-01 PROCEDURE — C8929: CPT

## 2022-01-01 PROCEDURE — 76705 ECHO EXAM OF ABDOMEN: CPT | Mod: 26

## 2022-01-01 PROCEDURE — 84295 ASSAY OF SERUM SODIUM: CPT

## 2022-01-01 PROCEDURE — 85014 HEMATOCRIT: CPT

## 2022-01-01 PROCEDURE — 84443 ASSAY THYROID STIM HORMONE: CPT

## 2022-01-01 PROCEDURE — 84478 ASSAY OF TRIGLYCERIDES: CPT

## 2022-01-01 PROCEDURE — 86901 BLOOD TYPING SEROLOGIC RH(D): CPT

## 2022-01-01 PROCEDURE — 87521 HEPATITIS C PROBE&RVRS TRNSC: CPT

## 2022-01-01 PROCEDURE — 31622 DX BRONCHOSCOPE/WASH: CPT

## 2022-01-01 PROCEDURE — 86923 COMPATIBILITY TEST ELECTRIC: CPT

## 2022-01-01 PROCEDURE — 99223 1ST HOSP IP/OBS HIGH 75: CPT

## 2022-01-01 PROCEDURE — 71045 X-RAY EXAM CHEST 1 VIEW: CPT | Mod: 26

## 2022-01-01 PROCEDURE — 99233 SBSQ HOSP IP/OBS HIGH 50: CPT

## 2022-01-01 PROCEDURE — 36620 INSERTION CATHETER ARTERY: CPT

## 2022-01-01 PROCEDURE — U0003: CPT

## 2022-01-01 PROCEDURE — 36600 WITHDRAWAL OF ARTERIAL BLOOD: CPT

## 2022-01-01 PROCEDURE — 70450 CT HEAD/BRAIN W/O DYE: CPT | Mod: 26,MH

## 2022-01-01 PROCEDURE — 36430 TRANSFUSION BLD/BLD COMPNT: CPT

## 2022-01-01 PROCEDURE — 94660 CPAP INITIATION&MGMT: CPT

## 2022-01-01 PROCEDURE — 87070 CULTURE OTHR SPECIMN AEROBIC: CPT

## 2022-01-01 PROCEDURE — 99223 1ST HOSP IP/OBS HIGH 75: CPT | Mod: 25

## 2022-01-01 PROCEDURE — 93970 EXTREMITY STUDY: CPT | Mod: 26

## 2022-01-01 PROCEDURE — 82977 ASSAY OF GGT: CPT

## 2022-01-01 PROCEDURE — 0225U NFCT DS DNA&RNA 21 SARSCOV2: CPT

## 2022-01-01 PROCEDURE — 88112 CYTOPATH CELL ENHANCE TECH: CPT

## 2022-01-01 PROCEDURE — 82247 BILIRUBIN TOTAL: CPT

## 2022-01-01 PROCEDURE — 81001 URINALYSIS AUTO W/SCOPE: CPT

## 2022-01-01 PROCEDURE — P9045: CPT

## 2022-01-01 PROCEDURE — 84484 ASSAY OF TROPONIN QUANT: CPT

## 2022-01-01 PROCEDURE — 99292 CRITICAL CARE ADDL 30 MIN: CPT

## 2022-01-01 PROCEDURE — 82803 BLOOD GASES ANY COMBINATION: CPT

## 2022-01-01 PROCEDURE — 94003 VENT MGMT INPAT SUBQ DAY: CPT

## 2022-01-01 PROCEDURE — 82550 ASSAY OF CK (CPK): CPT

## 2022-01-01 PROCEDURE — 93306 TTE W/DOPPLER COMPLETE: CPT | Mod: 26

## 2022-01-01 PROCEDURE — 82947 ASSAY GLUCOSE BLOOD QUANT: CPT

## 2022-01-01 PROCEDURE — 80053 COMPREHEN METABOLIC PANEL: CPT

## 2022-01-01 PROCEDURE — 82330 ASSAY OF CALCIUM: CPT

## 2022-01-01 PROCEDURE — 94640 AIRWAY INHALATION TREATMENT: CPT

## 2022-01-01 PROCEDURE — 87186 SC STD MICRODIL/AGAR DIL: CPT

## 2022-01-01 PROCEDURE — 85027 COMPLETE CBC AUTOMATED: CPT

## 2022-01-01 PROCEDURE — 83880 ASSAY OF NATRIURETIC PEPTIDE: CPT

## 2022-01-01 PROCEDURE — 83605 ASSAY OF LACTIC ACID: CPT

## 2022-01-01 PROCEDURE — 71045 X-RAY EXAM CHEST 1 VIEW: CPT

## 2022-01-01 PROCEDURE — 86850 RBC ANTIBODY SCREEN: CPT

## 2022-01-01 PROCEDURE — 82248 BILIRUBIN DIRECT: CPT

## 2022-01-01 PROCEDURE — 36415 COLL VENOUS BLD VENIPUNCTURE: CPT

## 2022-01-01 PROCEDURE — 82962 GLUCOSE BLOOD TEST: CPT

## 2022-01-01 PROCEDURE — 87641 MR-STAPH DNA AMP PROBE: CPT

## 2022-01-01 PROCEDURE — 87640 STAPH A DNA AMP PROBE: CPT

## 2022-01-01 PROCEDURE — 84132 ASSAY OF SERUM POTASSIUM: CPT

## 2022-01-01 PROCEDURE — 86900 BLOOD TYPING SEROLOGIC ABO: CPT

## 2022-01-01 PROCEDURE — 99231 SBSQ HOSP IP/OBS SF/LOW 25: CPT | Mod: FS

## 2022-01-01 PROCEDURE — 74176 CT ABD & PELVIS W/O CONTRAST: CPT | Mod: 26

## 2022-01-01 PROCEDURE — 85025 COMPLETE CBC W/AUTO DIFF WBC: CPT

## 2022-01-01 PROCEDURE — 83036 HEMOGLOBIN GLYCOSYLATED A1C: CPT

## 2022-01-01 PROCEDURE — 87077 CULTURE AEROBIC IDENTIFY: CPT

## 2022-01-01 PROCEDURE — P9047: CPT

## 2022-01-01 PROCEDURE — 94002 VENT MGMT INPAT INIT DAY: CPT

## 2022-01-01 PROCEDURE — 84100 ASSAY OF PHOSPHORUS: CPT

## 2022-01-01 PROCEDURE — 80048 BASIC METABOLIC PNL TOTAL CA: CPT

## 2022-01-01 PROCEDURE — 99239 HOSP IP/OBS DSCHRG MGMT >30: CPT

## 2022-01-01 PROCEDURE — 71250 CT THORAX DX C-: CPT | Mod: 26

## 2022-01-01 PROCEDURE — P9016: CPT

## 2022-01-01 RX ORDER — LACTULOSE 10 G/15ML
20 SOLUTION ORAL EVERY 6 HOURS
Refills: 0 | Status: DISCONTINUED | OUTPATIENT
Start: 2022-01-01 | End: 2022-01-01

## 2022-01-01 RX ORDER — METRONIDAZOLE 500 MG
500 TABLET ORAL EVERY 8 HOURS
Refills: 0 | Status: DISCONTINUED | OUTPATIENT
Start: 2022-01-01 | End: 2022-01-01

## 2022-01-01 RX ORDER — SODIUM CHLORIDE 9 MG/ML
500 INJECTION, SOLUTION INTRAVENOUS ONCE
Refills: 0 | Status: COMPLETED | OUTPATIENT
Start: 2022-01-01 | End: 2022-01-01

## 2022-01-01 RX ORDER — PHENYLEPHRINE HYDROCHLORIDE 10 MG/ML
0.1 INJECTION INTRAVENOUS
Qty: 40 | Refills: 0 | Status: DISCONTINUED | OUTPATIENT
Start: 2022-01-01 | End: 2022-01-01

## 2022-01-01 RX ORDER — ALBUMIN HUMAN 25 %
50 VIAL (ML) INTRAVENOUS ONCE
Refills: 0 | Status: COMPLETED | OUTPATIENT
Start: 2022-01-01 | End: 2022-01-01

## 2022-01-01 RX ORDER — MORPHINE SULFATE 50 MG/1
2 CAPSULE, EXTENDED RELEASE ORAL ONCE
Refills: 0 | Status: DISCONTINUED | OUTPATIENT
Start: 2022-01-01 | End: 2022-01-01

## 2022-01-01 RX ORDER — NOREPINEPHRINE BITARTRATE/D5W 8 MG/250ML
0.05 PLASTIC BAG, INJECTION (ML) INTRAVENOUS
Qty: 8 | Refills: 0 | Status: DISCONTINUED | OUTPATIENT
Start: 2022-01-01 | End: 2022-01-01

## 2022-01-01 RX ORDER — ACETYLCYSTEINE 200 MG/ML
4 VIAL (ML) MISCELLANEOUS EVERY 6 HOURS
Refills: 0 | Status: DISCONTINUED | OUTPATIENT
Start: 2022-01-01 | End: 2022-01-01

## 2022-01-01 RX ORDER — SODIUM BICARBONATE 1 MEQ/ML
50 SYRINGE (ML) INTRAVENOUS ONCE
Refills: 0 | Status: COMPLETED | OUTPATIENT
Start: 2022-01-01 | End: 2022-01-01

## 2022-01-01 RX ORDER — PANTOPRAZOLE SODIUM 20 MG/1
40 TABLET, DELAYED RELEASE ORAL DAILY
Refills: 0 | Status: DISCONTINUED | OUTPATIENT
Start: 2022-01-01 | End: 2022-01-01

## 2022-01-01 RX ORDER — SODIUM CHLORIDE 9 MG/ML
1000 INJECTION, SOLUTION INTRAVENOUS
Refills: 0 | Status: DISCONTINUED | OUTPATIENT
Start: 2022-01-01 | End: 2022-01-01

## 2022-01-01 RX ORDER — FENTANYL CITRATE 50 UG/ML
50 INJECTION INTRAVENOUS ONCE
Refills: 0 | Status: DISCONTINUED | OUTPATIENT
Start: 2022-01-01 | End: 2022-01-01

## 2022-01-01 RX ORDER — OLANZAPINE 15 MG/1
2.5 TABLET, FILM COATED ORAL EVERY 6 HOURS
Refills: 0 | Status: DISCONTINUED | OUTPATIENT
Start: 2022-01-01 | End: 2022-01-01

## 2022-01-01 RX ORDER — CHLORHEXIDINE GLUCONATE 213 G/1000ML
1 SOLUTION TOPICAL
Refills: 0 | Status: DISCONTINUED | OUTPATIENT
Start: 2022-01-01 | End: 2022-01-01

## 2022-01-01 RX ORDER — IPRATROPIUM/ALBUTEROL SULFATE 18-103MCG
3 AEROSOL WITH ADAPTER (GRAM) INHALATION EVERY 6 HOURS
Refills: 0 | Status: DISCONTINUED | OUTPATIENT
Start: 2022-01-01 | End: 2022-01-01

## 2022-01-01 RX ORDER — POTASSIUM CHLORIDE 20 MEQ
20 PACKET (EA) ORAL ONCE
Refills: 0 | Status: COMPLETED | OUTPATIENT
Start: 2022-01-01 | End: 2022-01-01

## 2022-01-01 RX ORDER — PANTOPRAZOLE SODIUM 20 MG/1
40 TABLET, DELAYED RELEASE ORAL EVERY 12 HOURS
Refills: 0 | Status: DISCONTINUED | OUTPATIENT
Start: 2022-01-01 | End: 2022-01-01

## 2022-01-01 RX ORDER — HYDROCORTISONE 20 MG
50 TABLET ORAL EVERY 6 HOURS
Refills: 0 | Status: DISCONTINUED | OUTPATIENT
Start: 2022-01-01 | End: 2022-01-01

## 2022-01-01 RX ORDER — MORPHINE SULFATE 50 MG/1
2 CAPSULE, EXTENDED RELEASE ORAL
Refills: 0 | Status: DISCONTINUED | OUTPATIENT
Start: 2022-01-01 | End: 2022-01-01

## 2022-01-01 RX ORDER — FENTANYL CITRATE 50 UG/ML
0.5 INJECTION INTRAVENOUS
Qty: 2500 | Refills: 0 | Status: DISCONTINUED | OUTPATIENT
Start: 2022-01-01 | End: 2022-01-01

## 2022-01-01 RX ORDER — INSULIN GLARGINE 100 [IU]/ML
5 INJECTION, SOLUTION SUBCUTANEOUS EVERY MORNING
Refills: 0 | Status: DISCONTINUED | OUTPATIENT
Start: 2022-01-01 | End: 2022-01-01

## 2022-01-01 RX ORDER — PANTOPRAZOLE SODIUM 20 MG/1
8 TABLET, DELAYED RELEASE ORAL
Qty: 80 | Refills: 0 | Status: DISCONTINUED | OUTPATIENT
Start: 2022-01-01 | End: 2022-01-01

## 2022-01-01 RX ORDER — MIDODRINE HYDROCHLORIDE 2.5 MG/1
10 TABLET ORAL EVERY 8 HOURS
Refills: 0 | Status: DISCONTINUED | OUTPATIENT
Start: 2022-01-01 | End: 2022-01-01

## 2022-01-01 RX ORDER — SERTRALINE 25 MG/1
1 TABLET, FILM COATED ORAL
Qty: 0 | Refills: 0 | DISCHARGE

## 2022-01-01 RX ORDER — SODIUM CHLORIDE 9 MG/ML
1000 INJECTION, SOLUTION INTRAVENOUS ONCE
Refills: 0 | Status: COMPLETED | OUTPATIENT
Start: 2022-01-01 | End: 2022-01-01

## 2022-01-01 RX ORDER — ADENOSINE 3 MG/ML
12 INJECTION INTRAVENOUS ONCE
Refills: 0 | Status: COMPLETED | OUTPATIENT
Start: 2022-01-01 | End: 2022-01-01

## 2022-01-01 RX ORDER — METOPROLOL TARTRATE 50 MG
5 TABLET ORAL ONCE
Refills: 0 | Status: COMPLETED | OUTPATIENT
Start: 2022-01-01 | End: 2022-01-01

## 2022-01-01 RX ORDER — ALBUMIN HUMAN 25 %
250 VIAL (ML) INTRAVENOUS ONCE
Refills: 0 | Status: COMPLETED | OUTPATIENT
Start: 2022-01-01 | End: 2022-01-01

## 2022-01-01 RX ORDER — CEFTRIAXONE 500 MG/1
2000 INJECTION, POWDER, FOR SOLUTION INTRAMUSCULAR; INTRAVENOUS EVERY 24 HOURS
Refills: 0 | Status: DISCONTINUED | OUTPATIENT
Start: 2022-01-01 | End: 2022-01-01

## 2022-01-01 RX ORDER — VASOPRESSIN 20 [USP'U]/ML
0.04 INJECTION INTRAVENOUS
Qty: 50 | Refills: 0 | Status: DISCONTINUED | OUTPATIENT
Start: 2022-01-01 | End: 2022-01-01

## 2022-01-01 RX ORDER — DILTIAZEM HCL 120 MG
15 CAPSULE, EXT RELEASE 24 HR ORAL ONCE
Refills: 0 | Status: COMPLETED | OUTPATIENT
Start: 2022-01-01 | End: 2022-01-01

## 2022-01-01 RX ORDER — LOSARTAN POTASSIUM 25 MG/1
25 TABLET, FILM COATED ORAL
Qty: 30 | Refills: 8 | Status: ACTIVE | COMMUNITY
Start: 2021-06-15 | End: 1900-01-01

## 2022-01-01 RX ORDER — ALBUMIN HUMAN 25 %
50 VIAL (ML) INTRAVENOUS EVERY 8 HOURS
Refills: 0 | Status: DISCONTINUED | OUTPATIENT
Start: 2022-01-01 | End: 2022-01-01

## 2022-01-01 RX ORDER — HALOPERIDOL DECANOATE 100 MG/ML
2 INJECTION INTRAMUSCULAR ONCE
Refills: 0 | Status: COMPLETED | OUTPATIENT
Start: 2022-01-01 | End: 2022-01-01

## 2022-01-01 RX ORDER — FLUTICASONE PROPIONATE AND SALMETEROL 100; 50 UG/1; UG/1
100-50 POWDER RESPIRATORY (INHALATION)
Qty: 1 | Refills: 5 | Status: ACTIVE | COMMUNITY
Start: 2021-05-19 | End: 1900-01-01

## 2022-01-01 RX ORDER — FLUTICASONE PROPIONATE AND SALMETEROL 50; 250 UG/1; UG/1
1 POWDER ORAL; RESPIRATORY (INHALATION)
Qty: 0 | Refills: 0 | DISCHARGE

## 2022-01-01 RX ORDER — LACTULOSE 10 G/15ML
15 SOLUTION ORAL THREE TIMES A DAY
Refills: 0 | Status: DISCONTINUED | OUTPATIENT
Start: 2022-01-01 | End: 2022-01-01

## 2022-01-01 RX ORDER — POTASSIUM CHLORIDE 20 MEQ
20 PACKET (EA) ORAL
Refills: 0 | Status: COMPLETED | OUTPATIENT
Start: 2022-01-01 | End: 2022-01-01

## 2022-01-01 RX ORDER — DEXTROSE 50 % IN WATER 50 %
50 SYRINGE (ML) INTRAVENOUS ONCE
Refills: 0 | Status: DISCONTINUED | OUTPATIENT
Start: 2022-01-01 | End: 2022-01-01

## 2022-01-01 RX ORDER — ESMOLOL HCL 100MG/10ML
50 VIAL (ML) INTRAVENOUS
Qty: 2500 | Refills: 0 | Status: DISCONTINUED | OUTPATIENT
Start: 2022-01-01 | End: 2022-01-01

## 2022-01-01 RX ORDER — DEXMEDETOMIDINE HYDROCHLORIDE IN 0.9% SODIUM CHLORIDE 4 UG/ML
0.4 INJECTION INTRAVENOUS
Qty: 200 | Refills: 0 | Status: DISCONTINUED | OUTPATIENT
Start: 2022-01-01 | End: 2022-01-01

## 2022-01-01 RX ORDER — CALCIUM GLUCONATE 100 MG/ML
1 VIAL (ML) INTRAVENOUS ONCE
Refills: 0 | Status: COMPLETED | OUTPATIENT
Start: 2022-01-01 | End: 2022-01-01

## 2022-01-01 RX ORDER — AMIODARONE HYDROCHLORIDE 400 MG/1
150 TABLET ORAL ONCE
Refills: 0 | Status: COMPLETED | OUTPATIENT
Start: 2022-01-01 | End: 2022-01-01

## 2022-01-01 RX ORDER — ALBUMIN HUMAN 25 %
100 VIAL (ML) INTRAVENOUS ONCE
Refills: 0 | Status: COMPLETED | OUTPATIENT
Start: 2022-01-01 | End: 2022-01-01

## 2022-01-01 RX ORDER — INSULIN HUMAN 100 [IU]/ML
10 INJECTION, SOLUTION SUBCUTANEOUS ONCE
Refills: 0 | Status: COMPLETED | OUTPATIENT
Start: 2022-01-01 | End: 2022-01-01

## 2022-01-01 RX ORDER — PIPERACILLIN AND TAZOBACTAM 4; .5 G/20ML; G/20ML
3.38 INJECTION, POWDER, LYOPHILIZED, FOR SOLUTION INTRAVENOUS EVERY 8 HOURS
Refills: 0 | Status: DISCONTINUED | OUTPATIENT
Start: 2022-01-01 | End: 2022-01-01

## 2022-01-01 RX ORDER — SODIUM CHLORIDE 9 MG/ML
10 INJECTION INTRAMUSCULAR; INTRAVENOUS; SUBCUTANEOUS
Refills: 0 | Status: DISCONTINUED | OUTPATIENT
Start: 2022-01-01 | End: 2022-01-01

## 2022-01-01 RX ORDER — INSULIN HUMAN 100 [IU]/ML
10 INJECTION, SOLUTION SUBCUTANEOUS ONCE
Refills: 0 | Status: DISCONTINUED | OUTPATIENT
Start: 2022-01-01 | End: 2022-01-01

## 2022-01-01 RX ORDER — FUROSEMIDE 40 MG
20 TABLET ORAL ONCE
Refills: 0 | Status: COMPLETED | OUTPATIENT
Start: 2022-01-01 | End: 2022-01-01

## 2022-01-01 RX ORDER — ACETAMINOPHEN 500 MG
650 TABLET ORAL ONCE
Refills: 0 | Status: COMPLETED | OUTPATIENT
Start: 2022-01-01 | End: 2022-01-01

## 2022-01-01 RX ORDER — CALCIUM GLUCONATE 100 MG/ML
2 VIAL (ML) INTRAVENOUS ONCE
Refills: 0 | Status: COMPLETED | OUTPATIENT
Start: 2022-01-01 | End: 2022-01-01

## 2022-01-01 RX ORDER — CHLORHEXIDINE GLUCONATE 213 G/1000ML
15 SOLUTION TOPICAL EVERY 12 HOURS
Refills: 0 | Status: DISCONTINUED | OUTPATIENT
Start: 2022-01-01 | End: 2022-01-01

## 2022-01-01 RX ORDER — ACETAMINOPHEN 500 MG
1000 TABLET ORAL ONCE
Refills: 0 | Status: DISCONTINUED | OUTPATIENT
Start: 2022-01-01 | End: 2022-01-01

## 2022-01-01 RX ORDER — PROPOFOL 10 MG/ML
15.01 INJECTION, EMULSION INTRAVENOUS
Qty: 1000 | Refills: 0 | Status: DISCONTINUED | OUTPATIENT
Start: 2022-01-01 | End: 2022-01-01

## 2022-01-01 RX ORDER — PROPOFOL 10 MG/ML
10 INJECTION, EMULSION INTRAVENOUS
Qty: 1000 | Refills: 0 | Status: DISCONTINUED | OUTPATIENT
Start: 2022-01-01 | End: 2022-01-01

## 2022-01-01 RX ORDER — ROBINUL 0.2 MG/ML
0.2 INJECTION INTRAMUSCULAR; INTRAVENOUS ONCE
Refills: 0 | Status: COMPLETED | OUTPATIENT
Start: 2022-01-01 | End: 2022-01-01

## 2022-01-01 RX ORDER — DEXTROSE 10 % IN WATER 10 %
250 INTRAVENOUS SOLUTION INTRAVENOUS
Refills: 0 | Status: COMPLETED | OUTPATIENT
Start: 2022-01-01 | End: 2022-01-01

## 2022-01-01 RX ORDER — DEXMEDETOMIDINE HYDROCHLORIDE IN 0.9% SODIUM CHLORIDE 4 UG/ML
0.5 INJECTION INTRAVENOUS
Qty: 200 | Refills: 0 | Status: DISCONTINUED | OUTPATIENT
Start: 2022-01-01 | End: 2022-01-01

## 2022-01-01 RX ORDER — FENTANYL CITRATE 50 UG/ML
0.5 INJECTION INTRAVENOUS
Qty: 5000 | Refills: 0 | Status: DISCONTINUED | OUTPATIENT
Start: 2022-01-01 | End: 2022-01-01

## 2022-01-01 RX ORDER — HEPARIN SODIUM 5000 [USP'U]/ML
300 INJECTION INTRAVENOUS; SUBCUTANEOUS
Qty: 10000 | Refills: 0 | Status: DISCONTINUED | OUTPATIENT
Start: 2022-01-01 | End: 2022-01-01

## 2022-01-01 RX ORDER — PROPOFOL 10 MG/ML
15 INJECTION, EMULSION INTRAVENOUS
Qty: 1000 | Refills: 0 | Status: DISCONTINUED | OUTPATIENT
Start: 2022-01-01 | End: 2022-01-01

## 2022-01-01 RX ORDER — SODIUM BICARBONATE 1 MEQ/ML
150 SYRINGE (ML) INTRAVENOUS ONCE
Refills: 0 | Status: COMPLETED | OUTPATIENT
Start: 2022-01-01 | End: 2022-01-01

## 2022-01-01 RX ORDER — DEXTROSE 50 % IN WATER 50 %
50 SYRINGE (ML) INTRAVENOUS ONCE
Refills: 0 | Status: COMPLETED | OUTPATIENT
Start: 2022-01-01 | End: 2022-01-01

## 2022-01-01 RX ORDER — SODIUM ZIRCONIUM CYCLOSILICATE 10 G/10G
10 POWDER, FOR SUSPENSION ORAL ONCE
Refills: 0 | Status: COMPLETED | OUTPATIENT
Start: 2022-01-01 | End: 2022-01-01

## 2022-01-01 RX ORDER — FENTANYL CITRATE 50 UG/ML
50 INJECTION INTRAVENOUS EVERY 4 HOURS
Refills: 0 | Status: DISCONTINUED | OUTPATIENT
Start: 2022-01-01 | End: 2022-01-01

## 2022-01-01 RX ORDER — LACTULOSE 10 G/15ML
20 SOLUTION ORAL EVERY 12 HOURS
Refills: 0 | Status: DISCONTINUED | OUTPATIENT
Start: 2022-01-01 | End: 2022-01-01

## 2022-01-01 RX ORDER — MIDAZOLAM HYDROCHLORIDE 1 MG/ML
2 INJECTION, SOLUTION INTRAMUSCULAR; INTRAVENOUS
Refills: 0 | Status: DISCONTINUED | OUTPATIENT
Start: 2022-01-01 | End: 2022-01-01

## 2022-01-01 RX ORDER — ALBUMIN HUMAN 25 %
25 VIAL (ML) INTRAVENOUS EVERY 6 HOURS
Refills: 0 | Status: DISCONTINUED | OUTPATIENT
Start: 2022-01-01 | End: 2022-01-01

## 2022-01-01 RX ORDER — ADENOSINE 3 MG/ML
6 INJECTION INTRAVENOUS ONCE
Refills: 0 | Status: COMPLETED | OUTPATIENT
Start: 2022-01-01 | End: 2022-01-01

## 2022-01-01 RX ORDER — SODIUM CHLORIDE 9 MG/ML
3 INJECTION INTRAMUSCULAR; INTRAVENOUS; SUBCUTANEOUS EVERY 8 HOURS
Refills: 0 | Status: DISCONTINUED | OUTPATIENT
Start: 2022-01-01 | End: 2022-01-01

## 2022-01-01 RX ORDER — SERTRALINE 25 MG/1
50 TABLET, FILM COATED ORAL DAILY
Refills: 0 | Status: DISCONTINUED | OUTPATIENT
Start: 2022-01-01 | End: 2022-01-01

## 2022-01-01 RX ORDER — MEROPENEM 1 G/30ML
1000 INJECTION INTRAVENOUS EVERY 12 HOURS
Refills: 0 | Status: DISCONTINUED | OUTPATIENT
Start: 2022-01-01 | End: 2022-01-01

## 2022-01-01 RX ORDER — PIPERACILLIN AND TAZOBACTAM 4; .5 G/20ML; G/20ML
3.38 INJECTION, POWDER, LYOPHILIZED, FOR SOLUTION INTRAVENOUS ONCE
Refills: 0 | Status: COMPLETED | OUTPATIENT
Start: 2022-01-01 | End: 2022-01-01

## 2022-01-01 RX ORDER — SODIUM BICARBONATE 1 MEQ/ML
0.48 SYRINGE (ML) INTRAVENOUS
Qty: 150 | Refills: 0 | Status: DISCONTINUED | OUTPATIENT
Start: 2022-01-01 | End: 2022-01-01

## 2022-01-01 RX ORDER — AMIODARONE HYDROCHLORIDE 400 MG/1
0.5 TABLET ORAL
Qty: 900 | Refills: 0 | Status: DISCONTINUED | OUTPATIENT
Start: 2022-01-01 | End: 2022-01-01

## 2022-01-01 RX ORDER — INSULIN LISPRO 100/ML
VIAL (ML) SUBCUTANEOUS EVERY 6 HOURS
Refills: 0 | Status: DISCONTINUED | OUTPATIENT
Start: 2022-01-01 | End: 2022-01-01

## 2022-01-01 RX ORDER — FENTANYL CITRATE 50 UG/ML
75 INJECTION INTRAVENOUS ONCE
Refills: 0 | Status: DISCONTINUED | OUTPATIENT
Start: 2022-01-01 | End: 2022-01-01

## 2022-01-01 RX ORDER — AMIODARONE HYDROCHLORIDE 400 MG/1
1 TABLET ORAL
Qty: 900 | Refills: 0 | Status: DISCONTINUED | OUTPATIENT
Start: 2022-01-01 | End: 2022-01-01

## 2022-01-01 RX ORDER — PROPOFOL 10 MG/ML
20 INJECTION, EMULSION INTRAVENOUS
Qty: 500 | Refills: 0 | Status: DISCONTINUED | OUTPATIENT
Start: 2022-01-01 | End: 2022-01-01

## 2022-01-01 RX ORDER — LOSARTAN POTASSIUM 100 MG/1
1 TABLET, FILM COATED ORAL
Qty: 0 | Refills: 0 | DISCHARGE

## 2022-01-01 RX ORDER — DEXTROSE 50 % IN WATER 50 %
25 SYRINGE (ML) INTRAVENOUS ONCE
Refills: 0 | Status: COMPLETED | OUTPATIENT
Start: 2022-01-01 | End: 2022-01-01

## 2022-01-01 RX ADMIN — PANTOPRAZOLE SODIUM 40 MILLIGRAM(S): 20 TABLET, DELAYED RELEASE ORAL at 17:07

## 2022-01-01 RX ADMIN — Medication 100 MILLIGRAM(S): at 13:49

## 2022-01-01 RX ADMIN — PANTOPRAZOLE SODIUM 40 MILLIGRAM(S): 20 TABLET, DELAYED RELEASE ORAL at 17:46

## 2022-01-01 RX ADMIN — CEFTRIAXONE 100 MILLIGRAM(S): 500 INJECTION, POWDER, FOR SOLUTION INTRAMUSCULAR; INTRAVENOUS at 10:17

## 2022-01-01 RX ADMIN — Medication 3 MILLILITER(S): at 03:26

## 2022-01-01 RX ADMIN — PIPERACILLIN AND TAZOBACTAM 25 GRAM(S): 4; .5 INJECTION, POWDER, LYOPHILIZED, FOR SOLUTION INTRAVENOUS at 23:31

## 2022-01-01 RX ADMIN — Medication 3 MILLILITER(S): at 15:54

## 2022-01-01 RX ADMIN — Medication 2: at 06:17

## 2022-01-01 RX ADMIN — Medication 12.5 MILLILITER(S): at 01:34

## 2022-01-01 RX ADMIN — Medication 20 MILLIGRAM(S): at 05:43

## 2022-01-01 RX ADMIN — CHLORHEXIDINE GLUCONATE 1 APPLICATION(S): 213 SOLUTION TOPICAL at 17:44

## 2022-01-01 RX ADMIN — CHLORHEXIDINE GLUCONATE 15 MILLILITER(S): 213 SOLUTION TOPICAL at 06:02

## 2022-01-01 RX ADMIN — VASOPRESSIN 2.4 UNIT(S)/MIN: 20 INJECTION INTRAVENOUS at 01:35

## 2022-01-01 RX ADMIN — CHLORHEXIDINE GLUCONATE 15 MILLILITER(S): 213 SOLUTION TOPICAL at 05:02

## 2022-01-01 RX ADMIN — FENTANYL CITRATE 2.36 MICROGRAM(S)/KG/HR: 50 INJECTION INTRAVENOUS at 09:38

## 2022-01-01 RX ADMIN — Medication 4: at 23:48

## 2022-01-01 RX ADMIN — CHLORHEXIDINE GLUCONATE 15 MILLILITER(S): 213 SOLUTION TOPICAL at 17:07

## 2022-01-01 RX ADMIN — DEXMEDETOMIDINE HYDROCHLORIDE IN 0.9% SODIUM CHLORIDE 4.72 MICROGRAM(S)/KG/HR: 4 INJECTION INTRAVENOUS at 21:06

## 2022-01-01 RX ADMIN — CHLORHEXIDINE GLUCONATE 15 MILLILITER(S): 213 SOLUTION TOPICAL at 05:07

## 2022-01-01 RX ADMIN — CHLORHEXIDINE GLUCONATE 15 MILLILITER(S): 213 SOLUTION TOPICAL at 04:37

## 2022-01-01 RX ADMIN — SODIUM CHLORIDE 60 MILLILITER(S): 9 INJECTION, SOLUTION INTRAVENOUS at 23:30

## 2022-01-01 RX ADMIN — SODIUM CHLORIDE 3 MILLILITER(S): 9 INJECTION INTRAMUSCULAR; INTRAVENOUS; SUBCUTANEOUS at 14:00

## 2022-01-01 RX ADMIN — SODIUM CHLORIDE 3 MILLILITER(S): 9 INJECTION INTRAMUSCULAR; INTRAVENOUS; SUBCUTANEOUS at 14:48

## 2022-01-01 RX ADMIN — PANTOPRAZOLE SODIUM 40 MILLIGRAM(S): 20 TABLET, DELAYED RELEASE ORAL at 06:11

## 2022-01-01 RX ADMIN — PROPOFOL 4.25 MICROGRAM(S)/KG/MIN: 10 INJECTION, EMULSION INTRAVENOUS at 05:17

## 2022-01-01 RX ADMIN — Medication 2: at 23:48

## 2022-01-01 RX ADMIN — PIPERACILLIN AND TAZOBACTAM 25 GRAM(S): 4; .5 INJECTION, POWDER, LYOPHILIZED, FOR SOLUTION INTRAVENOUS at 17:28

## 2022-01-01 RX ADMIN — CHLORHEXIDINE GLUCONATE 1 APPLICATION(S): 213 SOLUTION TOPICAL at 17:56

## 2022-01-01 RX ADMIN — Medication 100 MILLIGRAM(S): at 05:18

## 2022-01-01 RX ADMIN — Medication 2: at 18:06

## 2022-01-01 RX ADMIN — Medication 3 MILLILITER(S): at 04:06

## 2022-01-01 RX ADMIN — Medication 4.43 MICROGRAM(S)/KG/MIN: at 07:00

## 2022-01-01 RX ADMIN — PANTOPRAZOLE SODIUM 40 MILLIGRAM(S): 20 TABLET, DELAYED RELEASE ORAL at 11:05

## 2022-01-01 RX ADMIN — CHLORHEXIDINE GLUCONATE 15 MILLILITER(S): 213 SOLUTION TOPICAL at 17:40

## 2022-01-01 RX ADMIN — LACTULOSE 20 GRAM(S): 10 SOLUTION ORAL at 17:41

## 2022-01-01 RX ADMIN — Medication 50 MILLILITER(S): at 21:20

## 2022-01-01 RX ADMIN — CHLORHEXIDINE GLUCONATE 15 MILLILITER(S): 213 SOLUTION TOPICAL at 05:43

## 2022-01-01 RX ADMIN — CHLORHEXIDINE GLUCONATE 1 APPLICATION(S): 213 SOLUTION TOPICAL at 17:40

## 2022-01-01 RX ADMIN — FENTANYL CITRATE 50 MICROGRAM(S): 50 INJECTION INTRAVENOUS at 08:20

## 2022-01-01 RX ADMIN — AMIODARONE HYDROCHLORIDE 618 MILLIGRAM(S): 400 TABLET ORAL at 13:45

## 2022-01-01 RX ADMIN — SODIUM CHLORIDE 1000 MILLILITER(S): 9 INJECTION, SOLUTION INTRAVENOUS at 23:30

## 2022-01-01 RX ADMIN — AMIODARONE HYDROCHLORIDE 33.3 MG/MIN: 400 TABLET ORAL at 14:47

## 2022-01-01 RX ADMIN — MORPHINE SULFATE 2 MILLIGRAM(S): 50 CAPSULE, EXTENDED RELEASE ORAL at 14:30

## 2022-01-01 RX ADMIN — Medication 2: at 18:12

## 2022-01-01 RX ADMIN — Medication 50 MILLIEQUIVALENT(S): at 06:16

## 2022-01-01 RX ADMIN — Medication 3 MILLILITER(S): at 08:29

## 2022-01-01 RX ADMIN — Medication 50 MILLIEQUIVALENT(S): at 22:00

## 2022-01-01 RX ADMIN — Medication 12.5 MILLILITER(S): at 06:01

## 2022-01-01 RX ADMIN — Medication 14.2 MICROGRAM(S)/KG/MIN: at 11:04

## 2022-01-01 RX ADMIN — CHLORHEXIDINE GLUCONATE 1 APPLICATION(S): 213 SOLUTION TOPICAL at 18:06

## 2022-01-01 RX ADMIN — PROPOFOL 4.25 MICROGRAM(S)/KG/MIN: 10 INJECTION, EMULSION INTRAVENOUS at 11:05

## 2022-01-01 RX ADMIN — FENTANYL CITRATE 75 MICROGRAM(S): 50 INJECTION INTRAVENOUS at 21:10

## 2022-01-01 RX ADMIN — FENTANYL CITRATE 50 MICROGRAM(S): 50 INJECTION INTRAVENOUS at 21:15

## 2022-01-01 RX ADMIN — PROPOFOL 4.25 MICROGRAM(S)/KG/MIN: 10 INJECTION, EMULSION INTRAVENOUS at 08:58

## 2022-01-01 RX ADMIN — Medication 20 MILLIGRAM(S): at 14:46

## 2022-01-01 RX ADMIN — PANTOPRAZOLE SODIUM 40 MILLIGRAM(S): 20 TABLET, DELAYED RELEASE ORAL at 13:49

## 2022-01-01 RX ADMIN — Medication 3 MILLILITER(S): at 20:46

## 2022-01-01 RX ADMIN — Medication 20 MILLIGRAM(S): at 17:23

## 2022-01-01 RX ADMIN — Medication 3 MILLILITER(S): at 08:22

## 2022-01-01 RX ADMIN — INSULIN HUMAN 10 UNIT(S): 100 INJECTION, SOLUTION SUBCUTANEOUS at 03:36

## 2022-01-01 RX ADMIN — MORPHINE SULFATE 2 MILLIGRAM(S): 50 CAPSULE, EXTENDED RELEASE ORAL at 20:34

## 2022-01-01 RX ADMIN — Medication 3 MILLILITER(S): at 16:01

## 2022-01-01 RX ADMIN — Medication 4.43 MICROGRAM(S)/KG/MIN: at 01:35

## 2022-01-01 RX ADMIN — FENTANYL CITRATE 50 MICROGRAM(S): 50 INJECTION INTRAVENOUS at 20:42

## 2022-01-01 RX ADMIN — INSULIN GLARGINE 5 UNIT(S): 100 INJECTION, SOLUTION SUBCUTANEOUS at 09:25

## 2022-01-01 RX ADMIN — FENTANYL CITRATE 50 MICROGRAM(S): 50 INJECTION INTRAVENOUS at 21:09

## 2022-01-01 RX ADMIN — SODIUM CHLORIDE 3 MILLILITER(S): 9 INJECTION INTRAMUSCULAR; INTRAVENOUS; SUBCUTANEOUS at 06:35

## 2022-01-01 RX ADMIN — MIDAZOLAM HYDROCHLORIDE 2 MILLIGRAM(S): 1 INJECTION, SOLUTION INTRAMUSCULAR; INTRAVENOUS at 05:00

## 2022-01-01 RX ADMIN — Medication 12.5 MILLILITER(S): at 12:34

## 2022-01-01 RX ADMIN — PANTOPRAZOLE SODIUM 40 MILLIGRAM(S): 20 TABLET, DELAYED RELEASE ORAL at 09:57

## 2022-01-01 RX ADMIN — CHLORHEXIDINE GLUCONATE 15 MILLILITER(S): 213 SOLUTION TOPICAL at 05:18

## 2022-01-01 RX ADMIN — MORPHINE SULFATE 2 MILLIGRAM(S): 50 CAPSULE, EXTENDED RELEASE ORAL at 14:00

## 2022-01-01 RX ADMIN — FENTANYL CITRATE 50 MICROGRAM(S): 50 INJECTION INTRAVENOUS at 00:22

## 2022-01-01 RX ADMIN — PANTOPRAZOLE SODIUM 40 MILLIGRAM(S): 20 TABLET, DELAYED RELEASE ORAL at 18:06

## 2022-01-01 RX ADMIN — FENTANYL CITRATE 50 MICROGRAM(S): 50 INJECTION INTRAVENOUS at 17:28

## 2022-01-01 RX ADMIN — HALOPERIDOL DECANOATE 2 MILLIGRAM(S): 100 INJECTION INTRAMUSCULAR at 06:45

## 2022-01-01 RX ADMIN — Medication 3 MILLILITER(S): at 15:47

## 2022-01-01 RX ADMIN — SERTRALINE 50 MILLIGRAM(S): 25 TABLET, FILM COATED ORAL at 12:25

## 2022-01-01 RX ADMIN — CHLORHEXIDINE GLUCONATE 1 APPLICATION(S): 213 SOLUTION TOPICAL at 17:25

## 2022-01-01 RX ADMIN — Medication 50 MILLIEQUIVALENT(S): at 08:03

## 2022-01-01 RX ADMIN — SODIUM CHLORIDE 1000 MILLILITER(S): 9 INJECTION, SOLUTION INTRAVENOUS at 12:00

## 2022-01-01 RX ADMIN — FENTANYL CITRATE 50 MICROGRAM(S): 50 INJECTION INTRAVENOUS at 04:48

## 2022-01-01 RX ADMIN — CHLORHEXIDINE GLUCONATE 15 MILLILITER(S): 213 SOLUTION TOPICAL at 18:06

## 2022-01-01 RX ADMIN — Medication 3 MILLILITER(S): at 08:20

## 2022-01-01 RX ADMIN — Medication 100 MILLIGRAM(S): at 21:50

## 2022-01-01 RX ADMIN — Medication 20 MILLIGRAM(S): at 06:11

## 2022-01-01 RX ADMIN — MORPHINE SULFATE 2 MILLIGRAM(S): 50 CAPSULE, EXTENDED RELEASE ORAL at 04:51

## 2022-01-01 RX ADMIN — SODIUM CHLORIDE 3 MILLILITER(S): 9 INJECTION INTRAMUSCULAR; INTRAVENOUS; SUBCUTANEOUS at 05:26

## 2022-01-01 RX ADMIN — PROPOFOL 4.25 MICROGRAM(S)/KG/MIN: 10 INJECTION, EMULSION INTRAVENOUS at 12:43

## 2022-01-01 RX ADMIN — FENTANYL CITRATE 50 MICROGRAM(S): 50 INJECTION INTRAVENOUS at 06:35

## 2022-01-01 RX ADMIN — SODIUM CHLORIDE 3 MILLILITER(S): 9 INJECTION INTRAMUSCULAR; INTRAVENOUS; SUBCUTANEOUS at 22:47

## 2022-01-01 RX ADMIN — Medication 3 MILLILITER(S): at 20:26

## 2022-01-01 RX ADMIN — Medication 50 MILLILITER(S): at 12:31

## 2022-01-01 RX ADMIN — Medication 50 MILLILITER(S): at 05:06

## 2022-01-01 RX ADMIN — LACTULOSE 15 GRAM(S): 10 SOLUTION ORAL at 04:38

## 2022-01-01 RX ADMIN — LACTULOSE 15 GRAM(S): 10 SOLUTION ORAL at 22:11

## 2022-01-01 RX ADMIN — Medication 2: at 11:59

## 2022-01-01 RX ADMIN — Medication 5 MILLIGRAM(S): at 11:45

## 2022-01-01 RX ADMIN — MORPHINE SULFATE 2 MILLIGRAM(S): 50 CAPSULE, EXTENDED RELEASE ORAL at 04:21

## 2022-01-01 RX ADMIN — Medication 15 MILLIGRAM(S): at 09:02

## 2022-01-01 RX ADMIN — SODIUM CHLORIDE 1000 MILLILITER(S): 9 INJECTION, SOLUTION INTRAVENOUS at 12:21

## 2022-01-01 RX ADMIN — Medication 2: at 00:11

## 2022-01-01 RX ADMIN — CHLORHEXIDINE GLUCONATE 1 APPLICATION(S): 213 SOLUTION TOPICAL at 04:38

## 2022-01-01 RX ADMIN — PROPOFOL 4.25 MICROGRAM(S)/KG/MIN: 10 INJECTION, EMULSION INTRAVENOUS at 03:44

## 2022-01-01 RX ADMIN — Medication 50 MILLIEQUIVALENT(S): at 17:55

## 2022-01-01 RX ADMIN — INSULIN GLARGINE 5 UNIT(S): 100 INJECTION, SOLUTION SUBCUTANEOUS at 09:09

## 2022-01-01 RX ADMIN — Medication 3 MILLILITER(S): at 08:45

## 2022-01-01 RX ADMIN — Medication 100 MILLIEQUIVALENT(S): at 14:43

## 2022-01-01 RX ADMIN — CEFTRIAXONE 100 MILLIGRAM(S): 500 INJECTION, POWDER, FOR SOLUTION INTRAMUSCULAR; INTRAVENOUS at 09:09

## 2022-01-01 RX ADMIN — FENTANYL CITRATE 50 MICROGRAM(S): 50 INJECTION INTRAVENOUS at 19:34

## 2022-01-01 RX ADMIN — SODIUM CHLORIDE 3 MILLILITER(S): 9 INJECTION INTRAMUSCULAR; INTRAVENOUS; SUBCUTANEOUS at 13:59

## 2022-01-01 RX ADMIN — INSULIN HUMAN 10 UNIT(S): 100 INJECTION, SOLUTION SUBCUTANEOUS at 08:36

## 2022-01-01 RX ADMIN — PROPOFOL 4.25 MICROGRAM(S)/KG/MIN: 10 INJECTION, EMULSION INTRAVENOUS at 21:09

## 2022-01-01 RX ADMIN — Medication 999 MILLILITER(S): at 08:36

## 2022-01-01 RX ADMIN — CHLORHEXIDINE GLUCONATE 1 APPLICATION(S): 213 SOLUTION TOPICAL at 06:46

## 2022-01-01 RX ADMIN — CHLORHEXIDINE GLUCONATE 15 MILLILITER(S): 213 SOLUTION TOPICAL at 05:26

## 2022-01-01 RX ADMIN — Medication 200 GRAM(S): at 04:41

## 2022-01-01 RX ADMIN — Medication 20 MILLIGRAM(S): at 05:01

## 2022-01-01 RX ADMIN — Medication 3 MILLILITER(S): at 20:07

## 2022-01-01 RX ADMIN — SODIUM CHLORIDE 3 MILLILITER(S): 9 INJECTION INTRAMUSCULAR; INTRAVENOUS; SUBCUTANEOUS at 07:38

## 2022-01-01 RX ADMIN — Medication 260 MILLIGRAM(S): at 06:40

## 2022-01-01 RX ADMIN — LACTULOSE 20 GRAM(S): 10 SOLUTION ORAL at 13:48

## 2022-01-01 RX ADMIN — FENTANYL CITRATE 50 MICROGRAM(S): 50 INJECTION INTRAVENOUS at 06:03

## 2022-01-01 RX ADMIN — PANTOPRAZOLE SODIUM 40 MILLIGRAM(S): 20 TABLET, DELAYED RELEASE ORAL at 04:41

## 2022-01-01 RX ADMIN — ADENOSINE 12 MILLIGRAM(S): 3 INJECTION INTRAVENOUS at 08:50

## 2022-01-01 RX ADMIN — Medication 4.43 MICROGRAM(S)/KG/MIN: at 17:41

## 2022-01-01 RX ADMIN — Medication 100 MILLIGRAM(S): at 13:01

## 2022-01-01 RX ADMIN — SODIUM CHLORIDE 3 MILLILITER(S): 9 INJECTION INTRAMUSCULAR; INTRAVENOUS; SUBCUTANEOUS at 22:00

## 2022-01-01 RX ADMIN — FENTANYL CITRATE 50 MICROGRAM(S): 50 INJECTION INTRAVENOUS at 00:37

## 2022-01-01 RX ADMIN — Medication 2 MILLIGRAM(S): at 12:02

## 2022-01-01 RX ADMIN — Medication 3 MILLILITER(S): at 16:27

## 2022-01-01 RX ADMIN — CHLORHEXIDINE GLUCONATE 15 MILLILITER(S): 213 SOLUTION TOPICAL at 17:23

## 2022-01-01 RX ADMIN — CHLORHEXIDINE GLUCONATE 1 APPLICATION(S): 213 SOLUTION TOPICAL at 05:18

## 2022-01-01 RX ADMIN — PIPERACILLIN AND TAZOBACTAM 25 GRAM(S): 4; .5 INJECTION, POWDER, LYOPHILIZED, FOR SOLUTION INTRAVENOUS at 05:05

## 2022-01-01 RX ADMIN — MORPHINE SULFATE 2 MILLIGRAM(S): 50 CAPSULE, EXTENDED RELEASE ORAL at 21:04

## 2022-01-01 RX ADMIN — PANTOPRAZOLE SODIUM 40 MILLIGRAM(S): 20 TABLET, DELAYED RELEASE ORAL at 05:18

## 2022-01-01 RX ADMIN — SODIUM CHLORIDE 3 MILLILITER(S): 9 INJECTION INTRAMUSCULAR; INTRAVENOUS; SUBCUTANEOUS at 21:36

## 2022-01-01 RX ADMIN — PROPOFOL 4.25 MICROGRAM(S)/KG/MIN: 10 INJECTION, EMULSION INTRAVENOUS at 00:47

## 2022-01-01 RX ADMIN — ROBINUL 0.2 MILLIGRAM(S): 0.2 INJECTION INTRAMUSCULAR; INTRAVENOUS at 12:02

## 2022-01-01 RX ADMIN — Medication 999 MILLILITER(S): at 06:28

## 2022-01-01 RX ADMIN — FENTANYL CITRATE 50 MICROGRAM(S): 50 INJECTION INTRAVENOUS at 20:00

## 2022-01-01 RX ADMIN — Medication 100 GRAM(S): at 05:43

## 2022-01-01 RX ADMIN — Medication 150 MILLIEQUIVALENT(S): at 04:23

## 2022-01-01 RX ADMIN — CHLORHEXIDINE GLUCONATE 15 MILLILITER(S): 213 SOLUTION TOPICAL at 17:27

## 2022-01-01 RX ADMIN — FENTANYL CITRATE 75 MICROGRAM(S): 50 INJECTION INTRAVENOUS at 22:01

## 2022-01-01 RX ADMIN — Medication 50 MILLIGRAM(S): at 03:35

## 2022-01-01 RX ADMIN — CHLORHEXIDINE GLUCONATE 1 APPLICATION(S): 213 SOLUTION TOPICAL at 17:21

## 2022-01-01 RX ADMIN — CEFTRIAXONE 100 MILLIGRAM(S): 500 INJECTION, POWDER, FOR SOLUTION INTRAMUSCULAR; INTRAVENOUS at 09:13

## 2022-01-01 RX ADMIN — SODIUM CHLORIDE 500 MILLILITER(S): 9 INJECTION, SOLUTION INTRAVENOUS at 06:29

## 2022-01-01 RX ADMIN — Medication 2: at 12:43

## 2022-01-01 RX ADMIN — HALOPERIDOL DECANOATE 2 MILLIGRAM(S): 100 INJECTION INTRAMUSCULAR at 13:15

## 2022-01-01 RX ADMIN — Medication 100 MILLIGRAM(S): at 05:06

## 2022-01-01 RX ADMIN — Medication 100 MILLIGRAM(S): at 13:31

## 2022-01-01 RX ADMIN — FENTANYL CITRATE 50 MICROGRAM(S): 50 INJECTION INTRAVENOUS at 08:35

## 2022-01-01 RX ADMIN — PANTOPRAZOLE SODIUM 40 MILLIGRAM(S): 20 TABLET, DELAYED RELEASE ORAL at 17:27

## 2022-01-01 RX ADMIN — SODIUM CHLORIDE 3 MILLILITER(S): 9 INJECTION INTRAMUSCULAR; INTRAVENOUS; SUBCUTANEOUS at 21:24

## 2022-01-01 RX ADMIN — LACTULOSE 20 GRAM(S): 10 SOLUTION ORAL at 11:05

## 2022-01-01 RX ADMIN — DEXMEDETOMIDINE HYDROCHLORIDE IN 0.9% SODIUM CHLORIDE 5.9 MICROGRAM(S)/KG/HR: 4 INJECTION INTRAVENOUS at 04:09

## 2022-01-01 RX ADMIN — Medication 20 MILLIGRAM(S): at 15:50

## 2022-01-01 RX ADMIN — CHLORHEXIDINE GLUCONATE 1 APPLICATION(S): 213 SOLUTION TOPICAL at 06:03

## 2022-01-01 RX ADMIN — Medication 50 MILLILITER(S): at 21:42

## 2022-01-01 RX ADMIN — Medication 4 MILLILITER(S): at 08:20

## 2022-01-01 RX ADMIN — LACTULOSE 20 GRAM(S): 10 SOLUTION ORAL at 18:06

## 2022-01-01 RX ADMIN — FENTANYL CITRATE 50 MICROGRAM(S): 50 INJECTION INTRAVENOUS at 04:32

## 2022-01-01 RX ADMIN — SODIUM CHLORIDE 3 MILLILITER(S): 9 INJECTION INTRAMUSCULAR; INTRAVENOUS; SUBCUTANEOUS at 14:53

## 2022-01-01 RX ADMIN — SODIUM ZIRCONIUM CYCLOSILICATE 10 GRAM(S): 10 POWDER, FOR SUSPENSION ORAL at 09:09

## 2022-01-01 RX ADMIN — PANTOPRAZOLE SODIUM 40 MILLIGRAM(S): 20 TABLET, DELAYED RELEASE ORAL at 05:02

## 2022-01-01 RX ADMIN — CHLORHEXIDINE GLUCONATE 1 APPLICATION(S): 213 SOLUTION TOPICAL at 05:44

## 2022-01-01 RX ADMIN — INSULIN GLARGINE 5 UNIT(S): 100 INJECTION, SOLUTION SUBCUTANEOUS at 09:43

## 2022-01-01 RX ADMIN — PIPERACILLIN AND TAZOBACTAM 25 GRAM(S): 4; .5 INJECTION, POWDER, LYOPHILIZED, FOR SOLUTION INTRAVENOUS at 03:29

## 2022-01-01 RX ADMIN — OLANZAPINE 2.5 MILLIGRAM(S): 15 TABLET, FILM COATED ORAL at 10:20

## 2022-01-01 RX ADMIN — FENTANYL CITRATE 50 MICROGRAM(S): 50 INJECTION INTRAVENOUS at 06:37

## 2022-01-01 RX ADMIN — HEPARIN SODIUM 0.6 UNIT(S)/HR: 5000 INJECTION INTRAVENOUS; SUBCUTANEOUS at 21:51

## 2022-01-01 RX ADMIN — Medication 100 MILLIGRAM(S): at 21:52

## 2022-01-01 RX ADMIN — Medication 4 MILLILITER(S): at 03:15

## 2022-01-01 RX ADMIN — SODIUM CHLORIDE 3 MILLILITER(S): 9 INJECTION INTRAMUSCULAR; INTRAVENOUS; SUBCUTANEOUS at 06:00

## 2022-01-01 RX ADMIN — MIDAZOLAM HYDROCHLORIDE 2 MILLIGRAM(S): 1 INJECTION, SOLUTION INTRAMUSCULAR; INTRAVENOUS at 21:22

## 2022-01-01 RX ADMIN — PANTOPRAZOLE SODIUM 40 MILLIGRAM(S): 20 TABLET, DELAYED RELEASE ORAL at 05:43

## 2022-01-01 RX ADMIN — PIPERACILLIN AND TAZOBACTAM 25 GRAM(S): 4; .5 INJECTION, POWDER, LYOPHILIZED, FOR SOLUTION INTRAVENOUS at 17:00

## 2022-01-01 RX ADMIN — Medication 75 MEQ/KG/HR: at 06:29

## 2022-01-01 RX ADMIN — MEROPENEM 100 MILLIGRAM(S): 1 INJECTION INTRAVENOUS at 06:08

## 2022-01-01 RX ADMIN — CHLORHEXIDINE GLUCONATE 1 APPLICATION(S): 213 SOLUTION TOPICAL at 05:07

## 2022-01-01 RX ADMIN — ADENOSINE 6 MILLIGRAM(S): 3 INJECTION INTRAVENOUS at 08:45

## 2022-01-01 RX ADMIN — Medication 3 MILLILITER(S): at 04:37

## 2022-01-01 RX ADMIN — SODIUM CHLORIDE 1000 MILLILITER(S): 9 INJECTION, SOLUTION INTRAVENOUS at 21:52

## 2022-01-01 RX ADMIN — CHLORHEXIDINE GLUCONATE 15 MILLILITER(S): 213 SOLUTION TOPICAL at 17:51

## 2022-01-01 RX ADMIN — Medication 50 MILLILITER(S): at 03:35

## 2022-01-01 RX ADMIN — PIPERACILLIN AND TAZOBACTAM 25 GRAM(S): 4; .5 INJECTION, POWDER, LYOPHILIZED, FOR SOLUTION INTRAVENOUS at 08:33

## 2022-01-01 RX ADMIN — Medication 2: at 11:05

## 2022-01-01 RX ADMIN — LACTULOSE 20 GRAM(S): 10 SOLUTION ORAL at 05:07

## 2022-01-01 RX ADMIN — CHLORHEXIDINE GLUCONATE 1 APPLICATION(S): 213 SOLUTION TOPICAL at 05:02

## 2022-01-01 RX ADMIN — Medication 3 MILLILITER(S): at 20:00

## 2022-01-01 RX ADMIN — Medication 50 MILLIEQUIVALENT(S): at 19:04

## 2022-01-01 RX ADMIN — CHLORHEXIDINE GLUCONATE 1 APPLICATION(S): 213 SOLUTION TOPICAL at 17:27

## 2022-01-01 RX ADMIN — Medication 3 MILLILITER(S): at 08:23

## 2022-01-01 RX ADMIN — Medication 3 MILLILITER(S): at 02:07

## 2022-01-01 RX ADMIN — Medication 100 MILLIGRAM(S): at 21:20

## 2022-01-01 RX ADMIN — PANTOPRAZOLE SODIUM 10 MG/HR: 20 TABLET, DELAYED RELEASE ORAL at 05:26

## 2022-01-01 RX ADMIN — PANTOPRAZOLE SODIUM 40 MILLIGRAM(S): 20 TABLET, DELAYED RELEASE ORAL at 17:24

## 2022-01-01 RX ADMIN — Medication 14.2 MICROGRAM(S)/KG/MIN: at 00:47

## 2022-01-01 RX ADMIN — FENTANYL CITRATE 50 MICROGRAM(S): 50 INJECTION INTRAVENOUS at 17:27

## 2022-01-01 RX ADMIN — PIPERACILLIN AND TAZOBACTAM 25 GRAM(S): 4; .5 INJECTION, POWDER, LYOPHILIZED, FOR SOLUTION INTRAVENOUS at 10:11

## 2022-01-01 RX ADMIN — Medication 50 MILLILITER(S): at 20:34

## 2022-01-01 RX ADMIN — Medication 200 GRAM(S): at 06:17

## 2022-01-01 RX ADMIN — Medication 125 MILLILITER(S): at 17:39

## 2022-01-01 RX ADMIN — Medication 650 MILLIGRAM(S): at 07:00

## 2022-01-01 RX ADMIN — INSULIN GLARGINE 5 UNIT(S): 100 INJECTION, SOLUTION SUBCUTANEOUS at 09:13

## 2022-01-01 RX ADMIN — PIPERACILLIN AND TAZOBACTAM 200 GRAM(S): 4; .5 INJECTION, POWDER, LYOPHILIZED, FOR SOLUTION INTRAVENOUS at 00:15

## 2022-01-01 RX ADMIN — PIPERACILLIN AND TAZOBACTAM 25 GRAM(S): 4; .5 INJECTION, POWDER, LYOPHILIZED, FOR SOLUTION INTRAVENOUS at 18:46

## 2022-01-01 RX ADMIN — Medication 3 MILLILITER(S): at 03:15

## 2022-01-01 RX ADMIN — Medication 12.5 MILLILITER(S): at 17:40

## 2022-01-01 RX ADMIN — FENTANYL CITRATE 2.36 MICROGRAM(S)/KG/HR: 50 INJECTION INTRAVENOUS at 08:58

## 2022-01-01 RX ADMIN — SODIUM CHLORIDE 75 MILLILITER(S): 9 INJECTION, SOLUTION INTRAVENOUS at 21:43

## 2022-01-01 RX ADMIN — Medication 50 MILLILITER(S): at 01:33

## 2022-01-01 RX ADMIN — Medication 20 MILLIGRAM(S): at 17:27

## 2022-01-01 RX ADMIN — FENTANYL CITRATE 2.36 MICROGRAM(S)/KG/HR: 50 INJECTION INTRAVENOUS at 19:05

## 2022-01-01 RX ADMIN — CHLORHEXIDINE GLUCONATE 1 APPLICATION(S): 213 SOLUTION TOPICAL at 05:26

## 2022-01-01 RX ADMIN — Medication 200 GRAM(S): at 21:32

## 2022-08-06 NOTE — H&P ADULT - ASSESSMENT
71 year old female with PMHx of hypertension, depression, asthma, Hep C, cirrhosis  former smoker 10 pk years, presented to [facility] after seeing PCP with hypoxia.  She recently moved and then became SOB, lethargic and anorexic prompting her visit to PCP.  Seemingly unrelated, had a recent fall out of bed with no LOC and no medical treatment but does have bruising mostly on right side.  She arrived at [place] on 8/5/22.  She had head CT which was negative for acute bleed or ischemia, CT negative for PE, lower extremity doppler negative for DVT.  She was hypoxic, placed on nasal canula, then had episode of " bloody vomit".  She was emergently intubated with reports of very difficult and friable airway.  Showed signs of hemoptysis, blood in mouth and coming from ET tube.  Right femoral arterial and triple lumen catheters were placed. She was sedated on propofol given blood and platelets.  She was transferred to [facility] for Bronchoscopy and EGD planned for 8/7/2022.

## 2022-08-06 NOTE — PATIENT PROFILE ADULT - FALL HARM RISK - HARM RISK INTERVENTIONS

## 2022-08-06 NOTE — H&P ADULT - HISTORY OF PRESENT ILLNESS
71 year old female with PMHx of hypertension, depression, asthma, former smoker 10 pk years, presented to [facility] after seeing PCP with hypoxia.  She recently moved and then became SOB, lethargic and anorexic prompting her visit to PCP.  Seemingly unrelated had a recent fall out of bed with no LOC and no medical treatment but does have bruising most;y on right side.  She arrived at [place] on 8/5/22.  She had head CT which was negative for acute bleed or ischemia 71 year old female with PMHx of hypertension, depression, asthma, former smoker 10 pk years, presented to [facility] after seeing PCP with hypoxia.  She recently moved and then became SOB, lethargic and anorexic prompting her visit to PCP.  Seemingly unrelated, had a recent fall out of bed with no LOC and no medical treatment but does have bruising mostly on right side.  She arrived at [place] on 8/5/22.  She had head CT which was negative for acute bleed or ischemia, CT negative for PE, lower extremity doppler negative for DVT.  She was hypoxic, placed on nasal canula, then had episode of " bloody vomit".  She was emergently intubated with reports of very difficult and friable airway.  Showed signs of hemoptysis, blood in mouth and coming from ET tube.  Right femoral arterial and triple lumen catheters were placed. She was sedated on propofol given blood and platelets.  She was transferred to [facility] for Bronchoscopy and EGD planned for 8/7/2022.

## 2022-08-06 NOTE — H&P ADULT - NSICDXPASTMEDICALHX_GEN_ALL_CORE_FT
PAST MEDICAL HISTORY:  Acute asthma exacerbation     Anxiety     Cirrhosis     Hepatitis C     Hypertension     Major Depressive Disorder     OCD (Obsessive Compulsive Disorder)

## 2022-08-06 NOTE — H&P ADULT - PROBLEM SELECTOR PLAN 1
Currently intubated  Serial CBC  Plan for Bronchoscopy in AM  Plan to place radial a line  Keep NPO  NO lovenox for DVT prophylaxis due to hemoptysis.    SCD's B/L  Plan discussed with Dr Ren and agreed with plan

## 2022-08-06 NOTE — H&P ADULT - NSHPPHYSICALEXAM_GEN_ALL_CORE
T(C): 37.4 (08-06-22 @ 14:00), Max: 37.4 (08-06-22 @ 14:00)  HR: 68 (08-06-22 @ 14:30) (68 - 79)  BP: --  RR: 19 (08-06-22 @ 14:30) (19 - 20)  SpO2: 96% (08-06-22 @ 14:30) (96% - 100%)    CONSTITUTIONAL: intubated and sedated    EYES:  EOMI, No conjunctival or scleral injection, non-icteric    ENMT: Blood from mouth and ET tube  	NECK: Supple, symmetric and without tracheal deviation; thyroid gland not enlarged and without palpable masses    RESPIRATORY: Deminished breath sounds B/L    CARDIOVASCULAR: RRRR, +S1S2, no murmurs, no rubs, no gallops; no JVD; no peripheral edema  	Vascular: no carotid bruits; no abdominal bruit; carotid pulse palpable, radial pulse palpable, femoral pulse palpable, dorsalis pedis pulse palpable, posterior tibialis pulse palpable    GASTROINTESTINAL: Soft, non tender, non distended, no rebound, no guarding; ; no hernia palpated;  	    GENITOURINARY:    	Mortensen catheter in place    MUSCULOSKELETAL: Currently sedated not following commands    SKIN: No rashes or ulcers noted; no subcutaneous nodules or induration palpable    NEUROLOGIC: Sedated unable to assess  PSYCHIATRIC: Sedated unable to assess

## 2022-08-06 NOTE — H&P ADULT - NSHPLABSRESULTS_GEN_ALL_CORE
11.6   8.92  )-----------( 65       ( 06 Aug 2022 13:56 )             33.3   08-06    142  |  109<H>  |  82.0<H>  ----------------------------<  109<H>  3.5   |  19.0<L>  |  1.60<H>    Ca    7.0<L>      06 Aug 2022 13:56    TPro  5.3<L>  /  Alb  2.0<L>  /  TBili  3.0<H>  /  DBili  x   /  AST  104<H>  /  ALT  105<H>  /  AlkPhos  105  08-06

## 2022-08-07 NOTE — PROGRESS NOTE ADULT - ASSESSMENT
71F h/o htn, depression, asthma, hepatitis C, cirrhosis presented to PMD with complaints of weakness, falling.  Found to be hypoxic and directed to [facility] where she developed hemoptysis vs hematemesis. Pt emergently intubated noted to have friable airways and tx for [facility] for bronch/egd.  Pt received multiple transfusions, central venous, and arterial line placement.  she remains sedated on propofol gtt overbreathing ventilator and protonix IV.  Condition guarded

## 2022-08-07 NOTE — CONSULT NOTE ADULT - ASSESSMENT
72 F h/o recent traumatic intubation, ex smoker, blood in lungs.   Also cirrhotic, low plts, enlarged spleen, mild ascites.   Plan  HCV VL, genotype - this is not an inpatient issues, does not need to be addressed now  unlikely to be GI bleeding, Hb uptrending in spite of no transfusion - no EGD recommended  would be helpful to know pt's colon cancer screening hx form , ruma if GIB becomes a concern - Giuliano, [phone]  management per ICU team re: extubation, oral feeding, etc  Agree with PPI IV BID  please reconsult GI as needed

## 2022-08-07 NOTE — PROGRESS NOTE ADULT - SUBJECTIVE AND OBJECTIVE BOX
Patient seen and examined.  intubated sedated    T(C): 36.9 (08-07-22 @ 00:00)  T(F): 98.4 (08-07-22 @ 00:00)  HR: 60 (08-07-22 @ 00:00)  BP: --  BP(mean): --  ABP: 113/49 (08-07-22 @ 00:00)  ABP(mean): 75 (08-07-22 @ 00:00)  RR: 19 (08-07-22 @ 00:00)  SpO2: 100% (08-07-22 @ 00:00)  Wt(kg): --  CVP(mm Hg): --  CI: --  PA: --  PA(mean): --  PA(direct): --  SVRI: --  Mode: AC/ CMV (Assist Control/ Continuous Mandatory Ventilation), RR (machine): 12, TV (machine): 400, FiO2: 40, PEEP: 5, MAP: 7, PIP: 25    Physical Exam:  Gen: sedated  Pulm:  CTA b/l no r/r/w  CV:  S1S2, RRR, no m/r/g  Abd: +BS, soft, NT, ND  Ext:  +DP b/l, no c/c/e      I&O's Detail    06 Aug 2022 07:01  -  07 Aug 2022 00:38  --------------------------------------------------------  IN:    IV PiggyBack: 250 mL    IV PiggyBack: 300 mL    Lactated Ringers: 675 mL    Pantoprazole: 110 mL    PRBCs (Packed Red Blood Cells): 269 mL    Propofol: 122.9 mL  Total IN: 1726.9 mL    OUT:    Voided (mL): 290 mL  Total OUT: 290 mL    Total NET: 1436.9 mL                              10.8   7.48  )-----------( 62       ( 06 Aug 2022 19:20 )             30.8   08-06    143  |  110<H>  |  82.6<H>  ----------------------------<  110<H>  4.2   |  21.0<L>  |  1.60<H>    Ca    6.9<L>      06 Aug 2022 19:20  Mg     2.6     08-06    TPro  5.3<L>  /  Alb  2.0<L>  /  TBili  3.0<H>  /  DBili  x   /  AST  104<H>  /  ALT  105<H>  /  AlkPhos  105  08-06  aPTT: 34.1 sec; PT: 14.6 sec; INR: 1.26 ratio  08-06-22 @ 19:20       ABG - ( 06 Aug 2022 17:36 )  pH: 7.430 /  pCO2: 23    /  pO2: 77    / HCO3: 15    / Base Excess: -9.0  /  SaO2: 98.1 /  Lactate: x        CAPILLARY BLOOD GLUCOSE            Medications:  chlorhexidine 0.12% Liquid 15 milliLiter(s) Oral Mucosa every 12 hours  chlorhexidine 2% Cloths 1 Application(s) Topical two times a day  lactated ringers. 1000 milliLiter(s) IV Continuous <Continuous>  pantoprazole Infusion 8 mG/Hr IV Continuous <Continuous>  propofol Infusion 20 MICROgram(s)/kG/Min IV Continuous <Continuous>  sertraline 50 milliGRAM(s) Oral daily  sodium chloride 0.9% lock flush 3 milliLiter(s) IV Push every 8 hours

## 2022-08-08 NOTE — PROGRESS NOTE ADULT - SUBJECTIVE AND OBJECTIVE BOX
72F hx  Hep C cirrhosis  hypertension, depression, asthma ex smoker at [facility]  8/5 with type 1 resp failure hemoptysis intubated  CTA neg for PE. , Sent here 8/6 for bronchoscopy which was negative.  Noted with thrombocytopenia and RENITA with AMS from uremia and hyperammonemia.        Extubated today  transferred to the MICU service.   Hypoxemic on NIPPV 60% fio2 oxygenating and ventilating adequately.     Sputum cultures notable for Klebsiella PNA  S Pending on pip tazo   JOELLEN infiltrate on CT scan from [facility].     Some increased WOB and SVT's requiring NIPPV and an amiodarone infusion.      Now bradycardic and hypotensive amio and Precedex stopped.    Bolus with LR 1 liter     Hypernatremic start 1/2 ns at 50   RENITA is non oliguric     Requires constant observation.        Trial off BIPAP try on HFNC.    CCT 32

## 2022-08-08 NOTE — PROGRESS NOTE ADULT - PROBLEM SELECTOR PLAN 1
Blood noted in ett suction tubing.    -Patient currently on Full vent support  -titrate settings to maintain SaO2 >90%, or pH >7.25  -consider low tidal volume ventilation strategy w/ goal Tv 4-6 cc/kg of ideal body weight  -plateu pressure goal <30  -Peridex oral care  -aggressive pulmonary toilet  -trend CBC  -suction as needed  -plan egd and bronch in AM
Blood noted in ett suction tubing.    -Patient currently on Full vent support- plan for cpap trials in AM   -titrate settings to maintain SaO2 >90%, or pH >7.25  -plateu pressure goal <30  -Peridex oral care  -aggressive pulmonary toilet  -trend CBC  -suction as needed

## 2022-08-08 NOTE — DIETITIAN INITIAL EVALUATION ADULT - ETIOLOGY
Related to inadequate protein energy intake w/ increased needs in setting of COPD, cirrhosis admitted w/ hepatitis C previously treated, cirrhosis

## 2022-08-08 NOTE — PROGRESS NOTE ADULT - PROBLEM SELECTOR PLAN 2
-continue to trend cbc  -pt transfused 1 prbc last night for 1 pt drop in hemoglobin  -ngt to liws  -plan egd and bronch in AM
-continue to trend cbc  -pt transfused 1 prbc last night for 1 pt drop in hemoglobin  -ngt to liws  -GI signed off

## 2022-08-08 NOTE — DIETITIAN INITIAL EVALUATION ADULT - PERTINENT MEDS FT
MEDICATIONS  (STANDING):  acetylcysteine 20%  Inhalation 4 milliLiter(s) Inhalation every 6 hours  albuterol/ipratropium for Nebulization 3 milliLiter(s) Nebulizer every 6 hours  chlorhexidine 0.12% Liquid 15 milliLiter(s) Oral Mucosa every 12 hours  chlorhexidine 2% Cloths 1 Application(s) Topical two times a day  dexMEDEtomidine Infusion 0.5 MICROgram(s)/kG/Hr (5.9 mL/Hr) IV Continuous <Continuous>  lactulose Syrup 15 Gram(s) Oral three times a day  pantoprazole  Injectable 40 milliGRAM(s) IV Push every 12 hours  piperacillin/tazobactam IVPB.. 3.375 Gram(s) IV Intermittent every 8 hours  sertraline 50 milliGRAM(s) Oral daily  sodium chloride 0.9% lock flush 3 milliLiter(s) IV Push every 8 hours    MEDICATIONS  (PRN):

## 2022-08-08 NOTE — CONSULT NOTE ADULT - ASSESSMENT
71 yo female with 71 yo female with hx of hypertension, depression, asthma, former smoker 10 pk years, presented to [facility] after seeing PCP with hypoxia. Questionable blood vomit vs hemoptysis. Intubated for airway protection and hypoxia. Transferred for bronch and Gi eval for EGD. Patient now extubated ut redeveloped respiratory distress requiring BiPAP. HR fluctuating from normal rate to tachycardic Appears uncomfortable.    MICU team to assume care of patient.    Hypoxic Respiratory failure  -On Bipap and having good tidal volumes with appropriate o2 sat however patient has increased work of breathing. Monitor for fatigue and further decompensation  -Will repeat ABG as she has now been on Bipap for about 1-2 hours.    Hemoptysis  -Likely traumatic intubation as some blood was noted in tube, however no record of further bleeding  -Right upper lobe infiltrate noted by previous team.  -Continue with Zosyn.    Reported GI  -GI consulted and no plan for EGD  -Trend CBC, stable thus far    RENITA  -pre renal  -Continue maintenance fluid 71 yo female with 71 yo female with hx of hypertension, depression, asthma, former smoker 10 pk years, presented to [facility] after seeing PCP with hypoxia. Questionable blood vomit vs hemoptysis. Intubated for airway protection and hypoxia. Transferred for bronch and Gi eval for EGD. Patient now extubated but redeveloped respiratory distress requiring BiPAP. HR fluctuating from normal rate to tachycardic. Klebsiella grown in sputum.   MICU team to assume care of patient.    Hypoxic Respiratory failure  -On Bipap and having good tidal volumes with appropriate o2 sat however patient has increased work of breathing. Monitor for fatigue and further decompensation  -Will repeat ABG as she has now been on Bipap for about 1-2 hours.  -Morphine for comfort    Reported Hemoptysis  -Right upper lobe infiltrate noted by previous team. Klebsiella grown in Sputum, which is likely what was seen in tube.  -Continue with Zosyn.  -Reports of potential traumatic intubation as well contributing to blood seen.    Reported GI  -GI consulted and no plan for EGD  -Trend CBC, stable thus far    T[place]chyardia  -Appears to have intermittent runs of SVT  -Amiodarone bolus and drip started    RENITA  -Pre renal  -Continue maintenance fluid

## 2022-08-08 NOTE — DIETITIAN INITIAL EVALUATION ADULT - ORAL INTAKE PTA/DIET HISTORY
Pt is s/p extubation; remains lethargic at this time; unable to interview. NPO status maintained at this time.

## 2022-08-08 NOTE — PROGRESS NOTE ADULT - CARDIOVASCULAR
regular rate and rhythm Orbicularis Oris Muscle Flap Text: The defect edges were debeveled with a #15 scalpel blade.  Given that the defect affected the competency of the oral sphincter an obicularis oris muscle flap was deemed most appropriate to restore this competency and normal muscle function.  Using a sterile surgical marker, an appropriate flap was drawn incorporating the defect. The area thus outlined was incised with a #15 scalpel blade.

## 2022-08-08 NOTE — CHART NOTE - NSCHARTNOTEFT_GEN_A_CORE
Mrs Vogt's case and hospital course have been reviewed with Dr Faria and the MICU team. She remains critically ill requiring ICU level care in part d/t acute hypoxemic respiratory failure. She remains hemodynamically stable, is off pressors, but is requiring BiPAP (12/4, 60%) for post extubation respiratory distress (hypoxemia, tachypnea, accessory muscle use).  She has been transferred to MICU and Dr Faria has agreed to and will take over for the care of this patient. Mrs Vogt's case and hospital course have been reviewed with Dr Faria and the MICU team. She remains critically ill requiring ICU level care in part d/t acute hypoxemic respiratory failure. Pt passed SBT earlier this morning. She remains hemodynamically stable, is off pressors, but is requiring BiPAP (12/4, 60%) for post extubation respiratory distress (hypoxemia, tachypnea, accessory muscle use).  She has been transferred to MICU and Dr Faria has agreed to and will take over for the care of this patient. Thoracic surgery signing off. Please reconsult if needed.

## 2022-08-08 NOTE — PROGRESS NOTE ADULT - ASSESSMENT
71F h/o htn, depression with previous inpatient admission for SI, asthma, hepatitis C previously treated, cirrhosis with chronic thrombocytopenia presented to PMD with complaints of weakness, falling on right side 3 days prior to admission as well as 10 days of increasing shortness of breath found to be hypoxic and with possible RUL pulmonary contusion where there is report she developed hemoptysis where she was intubated for airway protection.    of note with friable tissues on intubation - she was tx to [facility] for further workup now s.p bronch & BAL   other active issues include cirrhosis, elevated tibili, worsening thrombocytopenia - baseline 70s, RENITA with baseline creatine 0.9-1.1 complicated by uremic encephalopathy and metabolic encephalopathy due to elevated ammonia     currently she is sedated on prop - pending tx to precedex given significant anxiety history, not requiring pressors to maintain her hemodynamics, currently NPO pending possible trial to extubation 8/8, acute blood loss anemia since resolving s/p PRBC & started on zosyn for ET secretions notably thick and brown     plan for trial off sedation - wean to extubated if able- will d.w team the utility of called renal for RENITA, pulmonary for known history of severely restrictive COPD with overlap syndrome and heme onc for acute on chronic thrombocytopenia int he setting of cirrhosis- GI had been called since signed off for GI bleed  without comment made on cirrhosis and or hep c     Overall patient still remains in critical condition as outlined above secondary to multiple MCFP/cc, multiple interventions as mentioned above which include but are not limited to high intensity invasive monitoring, serial labs, acute adjusting of medications, patient continues to slowly improve    FULL CODE     CRITICAL CARE TIME SPENT:   52 minutes noncontinuous time spent   Evaluating/treating patient's acute illness with high probability of causing end organ damage and or life threatening, reviewing data/labs/imaging, discussing case with multidisciplinary team, discussing plan/goals of care with patient/family to prevent further life threatening depreciation of the patient's condition . Non-inclusive of procedure time.

## 2022-08-08 NOTE — DIETITIAN INITIAL EVALUATION ADULT - PERTINENT LABORATORY DATA
08-08    147<H>  |  116<H>  |  73.1<H>  ----------------------------<  110<H>  4.4   |  19.0<L>  |  1.48<H>    Ca    6.9<L>      08 Aug 2022 02:18  Phos  3.8     08-08  Mg     2.6     08-08    TPro  5.4<L>  /  Alb  1.9<L>  /  TBili  2.8<H>  /  DBili  2.3<H>  /  AST  45<H>  /  ALT  57<H>  /  AlkPhos  85  08-08  POCT Blood Glucose.: 97 mg/dL (08-07-22 @ 17:18)  A1C with Estimated Average Glucose Result: 5.2 % (08-06-22 @ 13:56)

## 2022-08-08 NOTE — PROGRESS NOTE ADULT - SUBJECTIVE AND OBJECTIVE BOX
STEPHANIE BHATTI  MRN-[id number]    HPI:  71 year old female with PMHx of hypertension, depression, asthma, former smoker 10 pk years, presented to [facility] after seeing PCP with hypoxia.  She recently moved and then became SOB, lethargic and anorexic prompting her visit to PCP.  Seemingly unrelated, had a recent fall out of bed with no LOC and no medical treatment but does have bruising mostly on right side.  She arrived at [place] on 22.  She had head CT which was negative for acute bleed or ischemia, CT negative for PE, lower extremity doppler negative for DVT.  She was hypoxic, placed on nasal canula, then had episode of " bloody vomit".  She was emergently intubated with reports of very difficult and friable airway.  Showed signs of hemoptysis, blood in mouth and coming from ET tube.  Right femoral arterial and triple lumen catheters were placed. She was sedated on propofol given blood and platelets.  She was transferred to [facility] for Bronchoscopy and EGD planned for 2022.   (06 Aug 2022 14:16)      Surgery/Hospital Course:  72 F h/o recent traumatic intubation, ex smoker, blood in lungs.   Also cirrhotic, low plts, enlarged spleen, mild ascites.   Plan  HCV VL, genotype - this is not an inpatient issues, does not need to be addressed now  unlikely to be GI bleeding, Hb uptrending in spite of no transfusion - no EGD recommended  would be helpful to know pt's colon cancer screening hx form , ruma if GIB becomes a concern - Giuliano, [phone]  management per ICU team re: extubation, oral feeding, etc  Agree with PPI IV BID  please reconsult GI as needed ?  Today:  No acute events     ICU Vital Signs Last 24 Hrs  T(C): 37.2 (08 Aug 2022 09:00), Max: 37.7 (08 Aug 2022 07:25)  T(F): 99 (08 Aug 2022 09:00), Max: 99.9 (08 Aug 2022 07:25)  HR: 66 (08 Aug 2022 09:01) (58 - 83)  BP: 128/59 (08 Aug 2022 08:45) (80/47 - 128/59)  BP(mean): 85 (08 Aug 2022 08:45) (59 - 86)  ABP: 106/48 (08 Aug 2022 09:00) (78/37 - 140/55)  ABP(mean): 69 (08 Aug 2022 09:00) (52 - 92)  RR: 21 (08 Aug 2022 09:00) (15 - 33)  SpO2: 99% (08 Aug 2022 09:01) (92% - 100%)    O2 Parameters below as of 08 Aug 2022 10:10      O2 Concentration (%): 40        Physical Exam:  Gen:  Awake, alert   CNS: non focal 	  Neck: no JVD  RES : clear , no wheezing              CVS: Regular  rhythm. Normal S1/S2  Abd: Soft, non-distended. Bowel sounds present.  Skin: No rash.  Ext:  no edema    ============================I/O===========================   I&O's Detail    07 Aug 2022 07:01  -  08 Aug 2022 07:00  --------------------------------------------------------  IN:    Dexmedetomidine: 23.6 mL    Enteral Tube Flush: 250 mL    IV PiggyBack: 100 mL    IV PiggyBack: 100 mL    IV PiggyBack: 100 mL    IV PiggyBack: 100 mL    Lactated Ringers: 1800 mL    Pantoprazole: 30 mL    Propofol: 237.3 mL  Total IN: 2740.9 mL    OUT:    Indwelling Catheter - Urethral (mL): 670 mL    Nasogastric/Oral tube (mL): 250 mL    Norepinephrine: 0 mL    T-Tube (mL): 50 mL    Voided (mL): 535 mL  Total OUT: 1505 mL    Total NET: 1235.9 mL      08 Aug 2022 07:01  -  08 Aug 2022 10:17  --------------------------------------------------------  IN:    IV PiggyBack: 75 mL    Lactated Ringers: 225 mL    Lactated Ringers Bolus: 500 mL  Total IN: 800 mL    OUT:    Dexmedetomidine: 0 mL    Indwelling Catheter - Urethral (mL): 105 mL  Total OUT: 105 mL    Total NET: 695 mL        ============================ LABS =========================                        11.4   6.67  )-----------( 48       ( 08 Aug 2022 02:18 )             34.2     08-    147<H>  |  116<H>  |  73.1<H>  ----------------------------<  110<H>  4.4   |  19.0<L>  |  1.48<H>    Ca    6.9<L>      08 Aug 2022 02:18  Phos  3.8       Mg     2.6         TPro  5.4<L>  /  Alb  1.9<L>  /  TBili  2.8<H>  /  DBili  2.3<H>  /  AST  45<H>  /  ALT  57<H>  /  AlkPhos  85  0808    LIVER FUNCTIONS - ( 08 Aug 2022 02:18 )  Alb: 1.9 g/dL / Pro: 5.4 g/dL / ALK PHOS: 85 U/L / ALT: 57 U/L / AST: 45 U/L / GGT: x           PT/INR - ( 07 Aug 2022 20:45 )   PT: 14.0 sec;   INR: 1.20 ratio         PTT - ( 07 Aug 2022 20:45 )  PTT:37.3 sec  ABG - ( 08 Aug 2022 08:31 )  pH, Arterial: 7.410 pH, Blood: x     /  pCO2: 30    /  pO2: 85    / HCO3: 19    / Base Excess: -5.6  /  SaO2: 98.4              Urinalysis Basic - ( 07 Aug 2022 20:45 )    Color: Yellow / Appearance: Clear / S.020 / pH: x  Gluc: x / Ketone: Trace  / Bili: Negative / Urobili: 4   Blood: x / Protein: Negative / Nitrite: Positive   Leuk Esterase: Moderate / RBC: 3-5 /HPF / WBC 6-10 /HPF   Sq Epi: x / Non Sq Epi: Occasional / Bacteria: Few      ======================Micro/Rad/Cardio=================  Culture: Reviewed   CXR: Reviewed  Echo:Reviewed  ======================================================  PAST MEDICAL & SURGICAL HISTORY:  Major Depressive Disorder      OCD (Obsessive Compulsive Disorder)      Anxiety      Hepatitis C      Cirrhosis      Hypertension      Acute asthma exacerbation      S/P Liver Biopsy        ====================ASSESSMENT ==============  71F h/o htn, depression with previous inpatient admission for SI, asthma, hepatitis C previously treated, cirrhosis with chronic thrombocytopenia presented to PMD with complaints of weakness, falling on right side 3 days prior to admission as well as 10 days of increasing shortness of breath found to be hypoxic and with possible RUL pulmonary contusion where there is report she developed hemoptysis where she was intubated for airway protection.    of note with friable tissues on intubation - she was tx to [facility] for further workup now s.p bronch & BAL   other active issues include cirrhosis, elevated tibili, worsening thrombocytopenia - baseline 70s, RENITA with baseline creatine 0.9-1.1 complicated by uremic encephalopathy and metabolic encephalopathy due to elevated ammonia     currently she is sedated on prop - pending tx to precedex given significant anxiety history, not requiring pressors to maintain her hemodynamics, currently NPO pending possible trial to extubation , acute blood loss anemia since resolving s/p PRBC & started on zosyn for ET secretions notably thick and brown     plan for trial off sedation - wean to extubated if able- will d.w team the utility of called renal for RENITA, pulmonary for known history of severely restrictive COPD with overlap syndrome and heme onc for acute on chronic thrombocytopenia int he setting of cirrhosis- GI had been called since signed off for GI bleed  without comment made on cirrhosis and or hep c     Overall patient still remains in critical condition as outlined above secondary to multiple retirement/cc, multiple interventions as mentioned above which include but are not limited to high intensity invasive monitoring, serial labs, acute adjusting of medications, patient continues to slowly improve    FULL CODE     CRITICAL CARE TIME SPENT:   52 minutes noncontinuous time spent   Evaluating/treating patient's acute illness with high probability of causing end organ damage and or life threatening, reviewing data/labs/imaging, discussing case with multidisciplinary team, discussing plan/goals of care with patient/family to prevent further life threatening depreciation of the patient's condition . Non-inclusive of procedure time.          Problem/Plan - 1:  ·  Problem: Hemoptysis.   ·  Plan: Blood noted in ett suction tubing.    -Patient currently on Full vent support- plan for cpap trials in AM   -titrate settings to maintain SaO2 >90%, or pH >7.25  -plateu pressure goal <30  -Peridex oral care  -aggressive pulmonary toilet  -trend CBC  -suction as needed.    Problem/Plan - 2:  ·  Problem: GI bleed.   ·  Plan: -continue to trend cbc  -pt transfused 1 prbc last night for 1 pt drop in hemoglobin  -ngt to liws  -GI signed off.    Problem/Plan - 3:  ·  Problem: Thrombocytopenia.   ·  Plan: trend coags  low threshold for transfusion in setting of bleeding.    Problem/Plan - 4:  ·  Problem: Hepatitis C.   ·  Plan: GI consult pending to eval for hep C and cirrhosis?      Plan:  ====================== NEUROLOGY=====================  dexMEDEtomidine Infusion 0.5 MICROgram(s)/kG/Hr (5.9 mL/Hr) IV Continuous <Continuous>  sertraline 50 milliGRAM(s) Oral daily    ==================== RESPIRATORY======================  Post op respiratory insufficiency  Mechanical Ventilation:  Mode: AC/ CMV (Assist Control/ Continuous Mandatory Ventilation)  RR (machine): 10  TV (machine): 400  FiO2: 40  PEEP: 5  MAP: 12  PIP: 23    acetylcysteine 20%  Inhalation 4 milliLiter(s) Inhalation every 6 hours  albuterol/ipratropium for Nebulization 3 milliLiter(s) Nebulizer every 6 hours    ====================CARDIOVASCULAR==================  Post op Hypovolemia    ===================HEMATOLOGIC/ONC ===================  Monitor H&H/Plts      ===================== RENAL =========================  Continue monitoring urine output, I&OS, BUN/Cr     ==================== GASTROINTESTINAL===================  lactulose Syrup 15 Gram(s) Oral three times a day  pantoprazole  Injectable 40 milliGRAM(s) IV Push every 12 hours  sodium chloride 0.9% lock flush 3 milliLiter(s) IV Push every 8 hours    =======================    ENDOCRINE  =====================    ========================INFECTIOUS DISEASE================  piperacillin/tazobactam IVPB.. 3.375 Gram(s) IV Intermittent every 8 hours          I have spent 35 minutes providing acute care for this critically ill patient     Patient requires continuous monitoring with bedside rhythm monitoring, pulse ox monitoring, and intermittent blood gas analysis. Care plan discussed with ICU care team. Patient remained critical and at risk for life threatening decompensation.            STEPHANIE BHATTI  [id number]    HPI:  71 year old female with PMHx of hypertension, depression, asthma, former smoker 10 pk years, presented to [facility] after seeing PCP with hypoxia.  She recently moved and then became SOB, lethargic and anorexic prompting her visit to PCP.  Seemingly unrelated, had a recent fall out of bed with no LOC and no medical treatment but does have bruising mostly on right side.  She arrived at [place] on 22.  She had head CT which was negative for acute bleed or ischemia, CT negative for PE, lower extremity doppler negative for DVT.  She was hypoxic, placed on nasal canula, then had episode of " bloody vomit".  She was emergently intubated with reports of very difficult and friable airway.  Showed signs of hemoptysis, blood in mouth and coming from ET tube.  Right femoral arterial and triple lumen catheters were placed. She was sedated on propofol given blood and platelets.  She was transferred to [facility] for Bronchoscopy and EGD planned for 2022.   (06 Aug 2022 14:16)      Hospital Course:  -h/o recent traumatic intubation, ex smoker, blood in lungs.   -cirrhotic, low plts, enlarged spleen, mild ascites.   -GI consult : unlikely to be GI bleeding, Hb uptrending in spite of no transfusion - no EGD recommended    Today:  EXTUBATED  No acute events     ICU Vital Signs Last 24 Hrs  T(C): 37.2 (08 Aug 2022 09:00), Max: 37.7 (08 Aug 2022 07:25)  T(F): 99 (08 Aug 2022 09:00), Max: 99.9 (08 Aug 2022 07:25)  HR: 66 (08 Aug 2022 09:01) (58 - 83)  BP: 128/59 (08 Aug 2022 08:45) (80/47 - 128/59)  BP(mean): 85 (08 Aug 2022 08:45) (59 - 86)  ABP: 106/48 (08 Aug 2022 09:00) (78/37 - 140/55)  ABP(mean): 69 (08 Aug 2022 09:00) (52 - 92)  RR: 21 (08 Aug 2022 09:00) (15 - 33)  SpO2: 99% (08 Aug 2022 09:01) (92% - 100%)    O2 Parameters below as of 08 Aug 2022 10:10      O2 Concentration (%): 40        Physical Exam:  Gen:  Awake, alert   CNS: non focal 	  Neck: no JVD  RES : clear , no wheezing              CVS: Regular  rhythm. Normal S1/S2  Abd: Soft, non-distended. Bowel sounds present.  Skin: No rash.  Ext:  no edema    ============================I/O===========================   I&O's Detail    07 Aug 2022 07:01  -  08 Aug 2022 07:00  --------------------------------------------------------  IN:    Dexmedetomidine: 23.6 mL    Enteral Tube Flush: 250 mL    IV PiggyBack: 100 mL    IV PiggyBack: 100 mL    IV PiggyBack: 100 mL    IV PiggyBack: 100 mL    Lactated Ringers: 1800 mL    Pantoprazole: 30 mL    Propofol: 237.3 mL  Total IN: 2740.9 mL    OUT:    Indwelling Catheter - Urethral (mL): 670 mL    Nasogastric/Oral tube (mL): 250 mL    Norepinephrine: 0 mL    T-Tube (mL): 50 mL    Voided (mL): 535 mL  Total OUT: 1505 mL    Total NET: 1235.9 mL      08 Aug 2022 07:01  -  08 Aug 2022 10:17  --------------------------------------------------------  IN:    IV PiggyBack: 75 mL    Lactated Ringers: 225 mL    Lactated Ringers Bolus: 500 mL  Total IN: 800 mL    OUT:    Dexmedetomidine: 0 mL    Indwelling Catheter - Urethral (mL): 105 mL  Total OUT: 105 mL    Total NET: 695 mL        ============================ LABS =========================                        11.4   6.67  )-----------( 48       ( 08 Aug 2022 02:18 )             34.2     08-08    147<H>  |  116<H>  |  73.1<H>  ----------------------------<  110<H>  4.4   |  19.0<L>  |  1.48<H>    Ca    6.9<L>      08 Aug 2022 02:18  Phos  3.8     08-08  Mg     2.6     08-08    TPro  5.4<L>  /  Alb  1.9<L>  /  TBili  2.8<H>  /  DBili  2.3<H>  /  AST  45<H>  /  ALT  57<H>  /  AlkPhos  85  08-08    LIVER FUNCTIONS - ( 08 Aug 2022 02:18 )  Alb: 1.9 g/dL / Pro: 5.4 g/dL / ALK PHOS: 85 U/L / ALT: 57 U/L / AST: 45 U/L / GGT: x           PT/INR - ( 07 Aug 2022 20:45 )   PT: 14.0 sec;   INR: 1.20 ratio         PTT - ( 07 Aug 2022 20:45 )  PTT:37.3 sec  ABG - ( 08 Aug 2022 08:31 )  pH, Arterial: 7.410 pH, Blood: x     /  pCO2: 30    /  pO2: 85    / HCO3: 19    / Base Excess: -5.6  /  SaO2: 98.4              Urinalysis Basic - ( 07 Aug 2022 20:45 )    Color: Yellow / Appearance: Clear / S.020 / pH: x  Gluc: x / Ketone: Trace  / Bili: Negative / Urobili: 4   Blood: x / Protein: Negative / Nitrite: Positive   Leuk Esterase: Moderate / RBC: 3-5 /HPF / WBC 6-10 /HPF   Sq Epi: x / Non Sq Epi: Occasional / Bacteria: Few      ======================Micro/Rad/Cardio=================  Culture: Reviewed   CXR: Reviewed  Echo:Reviewed  ======================================================  PAST MEDICAL & SURGICAL HISTORY:  Major Depressive Disorder      OCD (Obsessive Compulsive Disorder)      Anxiety      Hepatitis C      Cirrhosis      Hypertension      Acute asthma exacerbation      S/P Liver Biopsy        ====================ASSESSMENT ==============  71F h/o htn, depression with previous inpatient admission for SI, asthma, hepatitis C previously treated, cirrhosis with chronic thrombocytopenia presented to PMD with complaints of weakness, falling on right side 3 days prior to admission as well as 10 days of increasing shortness of breath found to be hypoxic and with possible RUL pulmonary contusion where there is report she developed hemoptysis where she was intubated for airway protection.    of note with friable tissues on intubation - she was tx to [facility] for further workup now s.p bronch & BAL   other active issues include cirrhosis, elevated tibili, worsening thrombocytopenia - baseline 70s, RENITA with baseline creatine 0.9-1.1 complicated by uremic encephalopathy and metabolic encephalopathy due to elevated ammonia   currently she is sedated on prop - pending tx to precedex given significant anxiety history, not requiring pressors to maintain her hemodynamics, currently NPO pending possible trial to extubation , acute blood loss anemia since resolving s/p PRBC & started on zosyn for ET secretions notably thick and brown   plan for trial off sedation - wean to extubated if able- will d.w team the utility of called renal for RENITA, pulmonary for known history of severely restrictive COPD with overlap syndrome and heme onc for acute on chronic thrombocytopenia int he setting of cirrhosis- GI had been called since signed off for GI bleed  without comment made on cirrhosis and or hep c   Overall patient still remains in critical condition as outlined above secondary to multiple shelter/cc, multiple interventions as mentioned above which include but are not limited to high intensity invasive monitoring, serial labs, acute adjusting of medications, patient continues to slowly improve  FULL CODE     --- Hemoptysis.   ---GI bleed.   --- Thrombocytopenia.   ---Hepatitis C.     PLAN:  -Peridex oral care  -aggressive pulmonary toilet  -trend CBC  -suction as needed.  -continue to trend cbc  -pt transfused 1 prbc last night for 1 pt drop in hemoglobin  -trend coags  - GI consult  done    ====================== NEUROLOGY=====================  dexMEDEtomidine Infusion 0.5 MICROgram(s)/kG/Hr (5.9 mL/Hr) IV Continuous <Continuous>  sertraline 50 milliGRAM(s) Oral daily    ==================== RESPIRATORY======================  respiratory insufficiency  extubted    acetylcysteine 20%  Inhalation 4 milliLiter(s) Inhalation every 6 hours  albuterol/ipratropium for Nebulization 3 milliLiter(s) Nebulizer every 6 hours    ====================CARDIOVASCULAR==================  Hypovolemia    ===================HEMATOLOGIC/ONC ===================  Monitor H&H/Plts      ===================== RENAL =========================  Continue monitoring urine output, I&OS, BUN/Cr     ==================== GASTROINTESTINAL===================  lactulose Syrup 15 Gram(s) Oral three times a day  pantoprazole  Injectable 40 milliGRAM(s) IV Push every 12 hours  sodium chloride 0.9% lock flush 3 milliLiter(s) IV Push every 8 hours    =======================    ENDOCRINE  =====================    ========================INFECTIOUS DISEASE================  piperacillin/tazobactam IVPB.. 3.375 Gram(s) IV Intermittent every 8 hours          I have spent 35 minutes providing acute care for this critically ill patient     Patient requires continuous monitoring with bedside rhythm monitoring, pulse ox monitoring, and intermittent blood gas analysis. Care plan discussed with ICU care team. Patient remained critical and at risk for life threatening decompensation.

## 2022-08-08 NOTE — PROGRESS NOTE ADULT - PROBLEM SELECTOR PLAN 3
trend coags  low threshold for transfusion in setting of bleeding
trend coags  low threshold for transfusion in setting of bleeding

## 2022-08-08 NOTE — DIETITIAN INITIAL EVALUATION ADULT - NSFNSGIIOFT_GEN_A_CORE
08-07-22 @ 07:01  -  08-08-22 @ 07:00  --------------------------------------------------------  OUT:    Nasogastric/Oral tube (mL): 250 mL    T-Tube (mL): 50 mL  Total OUT: 300 mL    Total NET: -300 mL      08-08-22 @ 07:01  -  08-08-22 @ 10:51  --------------------------------------------------------  OUT:    Nasogastric/Oral tube (mL): 100 mL  Total OUT: 100 mL    Total NET: -100 mL

## 2022-08-08 NOTE — PROGRESS NOTE ADULT - SUBJECTIVE AND OBJECTIVE BOX
SUBJECTIVE   currently intubated and sedated     INTERIM HISTORY SIGNIFICANT FOR   s/p bronch - significant for friable tissues   remains intubated     Patient is a 72y old  Female who presents with a chief complaint of Hypoxia, Hemoptysis (07 Aug 2022 14:08)    HPI:  71 year old female with PMHx of hypertension, depression, asthma, former smoker 10 pk years, presented to [facility] after seeing PCP with hypoxia.  She recently moved and then became SOB, lethargic and anorexic prompting her visit to PCP.  Seemingly unrelated, had a recent fall out of bed with no LOC and no medical treatment but does have bruising mostly on right side.  She arrived at [facility] on 8/5/22.  She had head CT which was negative for acute bleed or ischemia, CT negative for PE, lower extremity doppler negative for DVT.  She was hypoxic, placed on nasal canula, then had episode of " bloody vomit".  She was emergently intubated with reports of very difficult and friable airway.  Showed signs of hemoptysis, blood in mouth and coming from ET tube.  Right femoral arterial and triple lumen catheters were placed. She was sedated on propofol given blood and platelets.  She was transferred to [facility] for Bronchoscopy and EGD planned for 8/7/2022.   (06 Aug 2022 14:16)    OBJECTIVE  PAST MEDICAL & SURGICAL HISTORY:  Major Depressive Disorder      OCD (Obsessive Compulsive Disorder)      Anxiety      Hepatitis C      Cirrhosis      Hypertension      Acute asthma exacerbation      S/P Liver Biopsy        phenothiazines (Unknown)    Home Medications:  losartan 25 mg oral tablet: 1 tab(s) orally once a day (06 Aug 2022 15:19)  sertraline 50 mg oral tablet: 1 tab(s) orally once a day (06 Aug 2022 15:19)  Wixela Inhub 100 mcg-50 mcg inhalation powder: 1 puff(s) inhaled 2 times a day (06 Aug 2022 15:19)    VITALS  Currently in sinus rhythm with vitals as below  ICU Vital Signs Last 24 Hrs  T(C): 37.3 (08 Aug 2022 04:00), Max: 37.3 (07 Aug 2022 22:00)  T(F): 99.1 (08 Aug 2022 04:00), Max: 99.1 (07 Aug 2022 22:00)  HR: 80 (08 Aug 2022 04:00) (64 - 82)  BP: --  BP(mean): --  ABP: 122/50 (08 Aug 2022 04:00) (100/49 - 140/55)  ABP(mean): 76 (08 Aug 2022 04:00) (65 - 92)  RR: 25 (08 Aug 2022 04:00) (15 - 26)  SpO2: 95% (08 Aug 2022 04:00) (92% - 100%)    O2 Parameters below as of 08 Aug 2022 04:00  Patient On (Oxygen Delivery Method): ventilator    O2 Concentration (%): 40      Adult Advanced Hemodynamics Last 24 Hrs  CVP(mm Hg): 12 (08 Aug 2022 04:00) (9 - 13)  CVP(cm H2O): --  CO: --  CI: --  PA: --  PA(mean): --  PCWP: --  SVR: --  SVRI: --  PVR: --  PVRI: --  LABS                        11.4   6.67  )-----------( 48       ( 08 Aug 2022 02:18 )             34.2     Culture - Bronchial (collected 07 Aug 2022 16:30)  Source: .Bronchial Bronchial Lavage  Gram Stain (07 Aug 2022 23:10):    No polymorphonuclear cells seen per low power field    No squamous epithelial cells per low power field    Few Gram Negative Rods per oil power field    Culture - Sputum (collected 06 Aug 2022 21:30)  Source: .Sputum Sputum  Gram Stain (07 Aug 2022 08:53):    Numerous polymorphonuclear leukocytes per low power field    No Squamous epithelial cells per low power field    Numerous Gram Negative Rods seen per oil power field    PT/INR - ( 07 Aug 2022 20:45 )   PT: 14.0 sec;   INR: 1.20 ratio         PTT - ( 07 Aug 2022 20:45 )  PTT:37.3 [id number]    147<H>  |  116<H>  |  73.1<H>  ----------------------------<  110<H>  4.4   |  19.0<L>  |  1.48<H>    Ca    6.9<L>      08 Aug 2022 02:18  Phos  3.8     08-08  Mg     2.6     08-08    TPro  5.4<L>  /  Alb  1.9<L>  /  TBili  2.8<H>  /  DBili  2.3<H>  /  AST  45<H>  /  ALT  57<H>  /  AlkPhos  85  08-08  CAPILLARY BLOOD GLUCOSE      POCT Blood Glucose.: 97 mg/dL (07 Aug 2022 17:18)  CARDIAC MARKERS ( 06 Aug 2022 18:50 )  x     / <0.01 ng/mL / 51 U/L / x     / x        Serum Pro-Brain Natriuretic Peptide: 1542 pg/mL (08-07-22 @ 20:45)    ABG - ( 07 Aug 2022 20:54 )  pH, Arterial: 7.410 pH, Blood: x     /  pCO2: 33    /  pO2: 154   / HCO3: 21    / Base Excess: -3.7  /  SaO2: 100.0             Mode: AC/ CMV (Assist Control/ Continuous Mandatory Ventilation)  RR (machine): 10  TV (machine): 400  FiO2: 50  PEEP: 5  MAP: 9  PIP: 16    IN/OUT    08-06-22 @ 07:01  -  08-07-22 @ 07:00  --------------------------------------------------------  IN: 2302.7 mL / OUT: 625 mL / NET: 1677.7 mL    08-07-22 @ 07:01  -  08-08-22 @ 04:16  --------------------------------------------------------  IN: 2192.3 mL / OUT: 1355 mL / NET: 837.3 mL      IMAGING  personally reviewed imaging     CURRENT MEDICATIONS  MEDICATIONS  (STANDING):  acetylcysteine 20%  Inhalation 4 milliLiter(s) Inhalation every 6 hours  albuterol/ipratropium for Nebulization 3 milliLiter(s) Nebulizer every 6 hours  calcium gluconate IVPB 2 Gram(s) IV Intermittent once  chlorhexidine 0.12% Liquid 15 milliLiter(s) Oral Mucosa every 12 hours  chlorhexidine 2% Cloths 1 Application(s) Topical two times a day  dexMEDEtomidine Infusion 0.5 MICROgram(s)/kG/Hr (5.9 mL/Hr) IV Continuous <Continuous>  lactated ringers. 1000 milliLiter(s) (75 mL/Hr) IV Continuous <Continuous>  lactulose Syrup 15 Gram(s) Oral three times a day  pantoprazole  Injectable 40 milliGRAM(s) IV Push every 12 hours  piperacillin/tazobactam IVPB.. 3.375 Gram(s) IV Intermittent every 8 hours  propofol Infusion 10 MICROgram(s)/kG/Min (2.83 mL/Hr) IV Continuous <Continuous>  sertraline 50 milliGRAM(s) Oral daily  sodium chloride 0.9% lock flush 3 milliLiter(s) IV Push every 8 hours    MEDICATIONS  (PRN):

## 2022-08-08 NOTE — CONSULT NOTE ADULT - ATTENDING COMMENTS
73 yo female with hx of HTN, depression, asthma, former smoker, transferred from [facility] to [facility]ICU on 8/6 with acute respiratory failure requiring intubation, possible hemoptysis vs hematemesis.  CT chest showed left upper lobe consolidation, scattered basilar airspace opacities, found to have Klebsiella growing in the sputum.  Underwent bronchoscopy by thoracic surgery.  Extubated 8/7.  Will treat for pneumonia with abx, steroids, bronchodilators.  Still tachypneic, will continue NIV for now.  Good tidal volumes and fio2 requirements.

## 2022-08-08 NOTE — DIETITIAN INITIAL EVALUATION ADULT - OTHER INFO
Pt is a 71 year old female with PMHx of hypertension, depression, asthma, former smoker 10 pk years, hepatitis C previously treated, cirrhosis, COPD presented to [facility] after seeing PCP with hypoxia.  She recently moved and then became SOB, lethargic and anorexic prompting her visit to PCP.  Seemingly unrelated, had a recent fall out of bed with no LOC and no medical treatment but does have bruising mostly on right side.  She arrived at [place] on 8/5/22.  She had head CT which was negative for acute bleed or ischemia, CT negative for PE, lower extremity doppler negative for DVT.  She was hypoxic, placed on nasal canula, then had episode of " bloody vomit".  She was emergently intubated with reports of very difficult and friable airway.  Showed signs of hemoptysis, blood in mouth and coming from ET tube.  Right femoral arterial and triple lumen catheters were placed. She was sedated on propofol given blood and platelets.  She was transferred to [facility] for Bronchoscopy and EGD planned for 8/7/2022.  Pt is now s/p bronch & BAL

## 2022-08-08 NOTE — PROGRESS NOTE ADULT - PROBLEM SELECTOR PLAN 4
GI consult pending to eval for hep C and cirrhosis    D/w Dr Ren.  Plan to be discussed again with team on AM rounds.
GI consult pending to eval for hep C and cirrhosis    D/w Dr Ren.  Plan to be discussed again with team on AM rounds.

## 2022-08-09 NOTE — PROGRESS NOTE ADULT - SUBJECTIVE AND OBJECTIVE BOX
Interval HPI:  delirious overnight  denies any pain or dyspnea, but moving around in bed constantly    Exam:  awake and alert, moving around constantly in bed, follows commands and responds appropriately  irreg rhythm  bilat air entry, rhonchi present  abd without rebound or guarding, nontender  no edema    Radiology: CT chest/abd/pelvsis pending     On Admission  22 (3d)  HPI:  71 year old female with PMHx of hypertension, depression, asthma, former smoker 10 pk years, presented to [facility] after seeing PCP with hypoxia.  She recently moved and then became SOB, lethargic and anorexic prompting her visit to PCP.  Seemingly unrelated, had a recent fall out of bed with no LOC and no medical treatment but does have bruising mostly on right side.  She arrived at [facility] on 22.  She had head CT which was negative for acute bleed or ischemia, CT negative for PE, lower extremity doppler negative for DVT.  She was hypoxic, placed on nasal canula, then had episode of " bloody vomit".  She was emergently intubated with reports of very difficult and friable airway.  Showed signs of hemoptysis, blood in mouth and coming from ET tube.  Right femoral arterial and triple lumen catheters were placed. She was sedated on propofol given blood and platelets.  She was transferred to [facility] for Bronchoscopy and EGD planned for 2022.   (06 Aug 2022 14:16)    PAST MEDICAL & SURGICAL HISTORY:  Major Depressive Disorder      OCD (Obsessive Compulsive Disorder)      Anxiety      Hepatitis C      Cirrhosis      Hypertension      Acute asthma exacerbation      S/P Liver Biopsy          Antimicrobial:  piperacillin/tazobactam IVPB.. 3.375 Gram(s) IV Intermittent every 8 hours    Cardiovascular:    Pulmonary:  albuterol/ipratropium for Nebulization 3 milliLiter(s) Nebulizer every 6 hours    Hematalogic:    Other:  albumin human 25% IVPB 50 milliLiter(s) IV Intermittent once  chlorhexidine 2% Cloths 1 Application(s) Topical two times a day  haloperidol    Injectable 2 milliGRAM(s) IV Push once  lactated ringers Bolus 500 milliLiter(s) IV Bolus once  lactulose Syrup 15 Gram(s) Oral three times a day  methylPREDNISolone sodium succinate Injectable 20 milliGRAM(s) IV Push every 12 hours  morphine  - Injectable 2 milliGRAM(s) IV Push once  morphine  - Injectable 2 milliGRAM(s) IV Push every 3 hours PRN  OLANZapine Injectable 2.5 milliGRAM(s) IntraMuscular every 6 hours PRN  pantoprazole  Injectable 40 milliGRAM(s) IV Push every 12 hours  sertraline 50 milliGRAM(s) Oral daily  sodium chloride 0.45%. 1000 milliLiter(s) IV Continuous <Continuous>  sodium chloride 0.9% lock flush 3 milliLiter(s) IV Push every 8 hours      Drug Dosing Weight  Height (cm): 157.4 (06 Aug 2022 12:20)  Weight (kg): 47.2 (06 Aug 2022 12:20)  BMI (kg/m2): 19.1 (06 Aug 2022 12:20)  BSA (m2): 1.45 (06 Aug 2022 12:20)    T(C): 36.9 (22 @ 11:00), Max: 36.9 (22 @ 11:00)  HR: 106 (22 @ 11:00)  BP: 132/83 (22 @ 11:00)  BP(mean): 92 (22 @ 11:00)  ABP: 86/68 (22 @ 18:45)  ABP(mean): 87 (22 @ 21:00)  RR: 28 (22 @ 11:00)  SpO2: 93% (22 @ 11:46)    ABG - ( 09 Aug 2022 09:45 )  pH, Arterial: 7.380 pH, Blood: x     /  pCO2: 29    /  pO2: 82    / HCO3: 17    / Base Excess: -7.9  /  SaO2: 99.3                   @ 07:01  -   @ 07:00  --------------------------------------------------------  IN: 3164.3 mL / OUT: 1175 mL / NET: 1989.3 mL              LABS:  CBC Full  -  ( 09 Aug 2022 04:30 )  WBC Count : 5.58 K/uL  RBC Count : 3.80 M/uL  Hemoglobin : 11.7 g/dL  Hematocrit : 34.9 %  Platelet Count - Automated : 42 K/uL  Mean Cell Volume : 91.8 fl  Mean Cell Hemoglobin : 30.8 pg  Mean Cell Hemoglobin Concentration : 33.5 gm/dL  Auto Neutrophil # : x  Auto Lymphocyte # : x  Auto Monocyte # : x  Auto Eosinophil # : x  Auto Basophil # : x  Auto Neutrophil % : x  Auto Lymphocyte % : x  Auto Monocyte % : x  Auto Eosinophil % : x  Auto Basophil % : x        145  |  113<H>  |  64.1<H>  ----------------------------<  118<H>  5.0   |  18.0<L>  |  1.26    Ca    6.9<L>      09 Aug 2022 04:30  Phos  4.8       Mg     2.6         TPro  5.4<L>  /  Alb  1.9<L>  /  TBili  2.8<H>  /  DBili  2.3<H>  /  AST  45<H>  /  ALT  57<H>  /  AlkPhos  85      PT/INR - ( 07 Aug 2022 20:45 )   PT: 14.0 sec;   INR: 1.20 ratio         PTT - ( 07 Aug 2022 20:45 )  PTT:37.3 sec  Urinalysis Basic - ( 07 Aug 2022 20:45 )    Color: Yellow / Appearance: Clear / S.020 / pH: x  Gluc: x / Ketone: Trace  / Bili: Negative / Urobili: 4   Blood: x / Protein: Negative / Nitrite: Positive   Leuk Esterase: Moderate / RBC: 3-5 /HPF / WBC 6-10 /HPF   Sq Epi: x / Non Sq Epi: Occasional / Bacteria: Few      Culture Results:   Numerous Klebsiella pneumoniae ( @ 16:30)  Culture Results:   No growth to date. ( @ 22:00)  Culture Results:   No growth to date. ( @ 22:00)  Culture Results:   Numerous Klebsiella pneumoniae  Normal Respiratory Estefany absent ( @ 21:30)    ____________________________________________________________________________________________________

## 2022-08-09 NOTE — PROGRESS NOTE ADULT - ASSESSMENT
Impression:  1. acute hypoxic respiratory failure  2. Klebsiella PNA-multilobar  3. infectious colitis  4. Hep C cirrhosis  5. pneumomediastinum  6. severe protein malnutrition  7. thrombocytopenia    Plan:  Neuro - Sedation with addition of PRN fentanyl  to facilitate safe ventilation    CV -  actively titrating pressors to keep MAP>65          Echo nml EF          lactate elevated, cont aggressive IV hydration with balanced fluids             Pulm -  full vent support with LTV 6-8cc/kg IDW, keeping plts<30             actively titrating FiO2 to keep sats>90%             will avoid heavy PEEP utilization given pneumomediastinum seen on CT, no ptx, airway pressures low currently              Scx + Klebs               bronchodialators              Vent bundle in place and reviewed     GI -  PPI          trend lactate          colitis felt to be infectious, checking GI PCR, currently no diarrhea          will holding enteric feeds for now pending course of colitis          aggressive IV hydration with balanced fluids    Renal - Cr improved, avoid MAP<65, renally adjust all meds, avoid nephrotoxins, strict I/Os, no hydro on CT, trend BMP.    Heme -  holding pharmacologic DVT PPx given plts<50k, no signs of active bleeding, H/H stable, tx plts for <50k with active bleeding or <10k    ID - afebrile, WBC normal, BCX NGTD, Bronch cx + klebsiella, cont Zosyn, abx adjustments based on clinical features and culture data    Endo -  Aggressive glycemic control to limit FS glucose to < 180mg/dl, BS stable.         Impression:  1. acute hypoxic respiratory failure  2. Klebsiella PNA-multilobar  3. infectious colitis  4. Hep C cirrhosis  5. pneumomediastinum  6. severe protein malnutrition  7. thrombocytopenia  8. septic shock    Plan:  Neuro - Sedation with addition of PRN fentanyl  to facilitate safe ventilation    CV -  actively titrating pressors to keep MAP>65          Echo nml EF          lactate elevated, cont aggressive IV hydration with balanced fluids             Pulm -  full vent support with LTV 6-8cc/kg IDW, keeping plts<30             actively titrating FiO2 to keep sats>90%             will avoid heavy PEEP utilization given pneumomediastinum seen on CT, no ptx, airway pressures low currently              Scx + Klebs               bronchodialators              Vent bundle in place and reviewed     GI -  PPI          trend lactate          colitis felt to be infectious, checking GI PCR, currently no diarrhea          will holding enteric feeds for now pending course of colitis          aggressive IV hydration with balanced fluids    Renal - Cr improved, avoid MAP<65, renally adjust all meds, avoid nephrotoxins, strict I/Os, no hydro on CT, trend BMP.    Heme -  holding pharmacologic DVT PPx given plts<50k, no signs of active bleeding, H/H stable, tx plts for <50k with active bleeding or <10k    ID - afebrile, WBC normal, BCX NGTD, Bronch cx + klebsiella, cont Zosyn, abx adjustments based on clinical features and culture data    Endo -  Aggressive glycemic control to limit FS glucose to < 180mg/dl, BS stable.

## 2022-08-09 NOTE — PROGRESS NOTE ADULT - ASSESSMENT
73 yo female with hx of Hep C, cirrhosis, HTN, depression, asthma, former smoker, transferred [place] [facility]ICU on 8/6 with acute respiratory failure requiring intubation, possible hemoptysis vs hematemesis.  CT chest showed left upper lobe consolidation/pneumonia, scattered basilar airspace opacities, found to have Klebsiella in sputum. Patient extubated to bipap on 8/7. Transferred from CTICU to MICU service on 8/8.    Now with persistent delirium rising lactate. Unclear etiology.    Acute respiratory failure/ Klebsiella pneumonia/ asthma exacerbation   - zosyn  - now on high flow, off bipap  - bronchodilators, steroids    elevated lactate  - small fluid bolus and albumin bolus  - CT chest/abd/pelvis today    delirium  - prn zyprexa    RENITA   - resolving with IV hydration    Hep C cirrhosis  - monitor ammonia level, restart lactulose when can swallow    Sedation/Analgesia: prn zyprexa  Vasoactive medications: none  DVT prophylaxis: SCDs  GI prophylaxis: protonix  Nutrition: npo  Central line: none  Mortensen:  present  Mobility: oob  Family/Patient engagement/GOC: family updated daily

## 2022-08-09 NOTE — PROGRESS NOTE ADULT - SUBJECTIVE AND OBJECTIVE BOX
Patient is a 72y old  Female who presents with a chief complaint of Hypoxia, Hemoptysis (09 Aug 2022 12:10)      BRIEF HOSPITAL COURSE:   72F with PMHx  Hep C cirrrhosis, HTN, depression, asthma, ex smoker tx from [place] after admitting with acute respiratory failure and hemoptysis. Sent to [facility] for evaluation by CTS. Course complicated by thrombocytopenia, RENITA, AMS, Klebs PNA, colitis, shock requiring pressors.       Events last 24 hours: afebrile, required reintubation today for progressive respiratory failure, unable to expectorate secretions, pressors added, sedated on propofol currently.     PAST MEDICAL & SURGICAL HISTORY:  Major Depressive Disorder      OCD (Obsessive Compulsive Disorder)      Anxiety      Hepatitis C      Cirrhosis      Hypertension      Acute asthma exacerbation      S/P Liver Biopsy          Review of Systems:  unable to perform at this time, pt intubated and sedated    Medications:  piperacillin/tazobactam IVPB.. 3.375 Gram(s) IV Intermittent every 8 hours    norepinephrine Infusion 0.05 MICROgram(s)/kG/Min IV Continuous <Continuous>    albuterol/ipratropium for Nebulization 3 milliLiter(s) Nebulizer every 6 hours    fentaNYL    Injectable 50 MICROGram(s) IV Push every 4 hours PRN  OLANZapine Injectable 2.5 milliGRAM(s) IntraMuscular every 6 hours PRN  propofol Infusion 15 MICROgram(s)/kG/Min IV Continuous <Continuous>        pantoprazole  Injectable 40 milliGRAM(s) IV Push every 12 hours      methylPREDNISolone sodium succinate Injectable 20 milliGRAM(s) IV Push every 12 hours    lactated ringers. 1000 milliLiter(s) IV Continuous <Continuous>  sodium chloride 0.9% lock flush 3 milliLiter(s) IV Push every 8 hours      chlorhexidine 0.12% Liquid 15 milliLiter(s) Oral Mucosa every 12 hours  chlorhexidine 2% Cloths 1 Application(s) Topical two times a day        Mode: AC/ CMV (Assist Control/ Continuous Mandatory Ventilation)  RR (machine): 18  TV (machine): 400  FiO2: 50  PEEP: 5  ITime: 1  MAP: 13  PIP: 23      ICU Vital Signs Last 24 Hrs  T(C): 36.7 (09 Aug 2022 19:00), Max: 37.2 (09 Aug 2022 13:30)  T(F): 98.1 (09 Aug 2022 19:00), Max: 99 (09 Aug 2022 13:30)  HR: 80 (09 Aug 2022 19:00) (51 - 112)  BP: 131/68 (09 Aug 2022 19:00) (72/48 - 150/86)  BP(mean): 88 (09 Aug 2022 19:00) (57 - 113)  ABP: --  ABP(mean): 87 (08 Aug 2022 21:00) (87 - 87)  RR: 30 (09 Aug 2022 19:00) (16 - 36)  SpO2: 92% (09 Aug 2022 19:00) (83% - 100%)    O2 Parameters below as of 09 Aug 2022 19:00      O2 Concentration (%): 50        ABG - ( 09 Aug 2022 17:55 )  pH, Arterial: 7.340 pH, Blood: x     /  pCO2: 32    /  pO2: 72    / HCO3: 17    / Base Excess: -8.5  /  SaO2: 96.5                      LABS:                        11.7   5.58  )-----------( 42       ( 09 Aug 2022 04:30 )             34.9     08-09    145  |  113<H>  |  64.1<H>  ----------------------------<  118<H>  5.0   |  18.0<L>  |  1.26    Ca    6.9<L>      09 Aug 2022 04:30  Phos  4.8     08-09  Mg     2.6     08-09    TPro  5.4<L>  /  Alb  1.9<L>  /  TBili  2.8<H>  /  DBili  2.3<H>  /  AST  45<H>  /  ALT  57<H>  /  AlkPhos  85  08-08          CAPILLARY BLOOD GLUCOSE      POCT Blood Glucose.: 94 mg/dL (09 Aug 2022 16:28)    PT/INR - ( 07 Aug 2022 20:45 )   PT: 14.0 sec;   INR: 1.20 ratio         PTT - ( 07 Aug 2022 20:45 )  PTT:37.3 sec  Urinalysis Basic - ( 07 Aug 2022 20:45 )    Color: Yellow / Appearance: Clear / S.020 / pH: x  Gluc: x / Ketone: Trace  / Bili: Negative / Urobili: 4   Blood: x / Protein: Negative / Nitrite: Positive   Leuk Esterase: Moderate / RBC: 3-5 /HPF / WBC 6-10 /HPF   Sq Epi: x / Non Sq Epi: Occasional / Bacteria: Few      CULTURES:  Rapid RVP Result: NotDetec ( @ 02:15)  Culture Results:   Numerous Klebsiella pneumoniae  Normal Respiratory Estefany absent ( @ 16:30)  Culture Results:   No growth to date. ( @ 22:00)  Culture Results:   No growth to date. ( @ 22:00)  Culture Results:   Numerous Klebsiella pneumoniae  Normal Respiratory Estefany absent ( @ 21:30)      Physical Examination:    General: agitated and distressed on vent with propofol, intubated      HEENT: Pupils equal, reactive to light.  Symmetric.    PULM: Course BS bilaterally, no wheezing noted, + sub Q crepitus    CVS: Regular rate and rhythm    ABD: Soft, nondistended, + BS    EXT: + edema    SKIN: Warm and well perfused, no rashes noted.    RADIOLOGY:   ACC: [id number] EXAM:  CT CHEST                        ACC: [id number] EXAM:  CT ABDOMEN AND PELVIS                          PROCEDURE DATE:  2022          INTERPRETATION:  CLINICAL INFORMATION: Hemoptysis. Pneumonia. Sepsis.   Rising lactate. History of cirrhosis.    COMPARISON: None.    PROCEDURE:  CT of the Chest, Abdomen and Pelvis was performed.  Sagittal and coronal reformats were performed.    FINDINGS:  CHEST:  LUNGS AND LARGE AIRWAYS: Patent central airways. Endotracheal tube in   place. There is rounded consolidative opacity with air bronchograms in   the right lower lobe posteriorly. There are and geographic groundglass   and consolidative opacities throughout the upper lobes and right middle   lobe and mild consolidative opacity in the left lower lobe with adjacent   groundglass density.  PLEURA: Mild layering right pleural effusion and trace layering left   pleural effusion. There is mild to moderate superior pneumomediastinum   extending into the neck.  VESSELS: Within normal limits.  HEART: Heart size is normal. No pericardial effusion.  MEDIASTINUM AND TOM: No lymphadenopathy.  CHEST WALL AND LOWER NECK: Within normal limits.    ABDOMEN AND PELVIS:  LIVER: Shrunken nodular liver, compatible with end-stage cirrhosis.  BILE DUCTS: Normal caliber.  GALLBLADDER: Layering small gallstones.  SPLEEN: Within normal limits.  PANCREAS: Within normal limits.  ADRENALS: Within normal limits.  KIDNEYS/URETERS: Within normal limits.    BLADDER: Mortensen catheter in the collapsed bladder.  REPRODUCTIVE ORGANS: Uterus and adnexa within normal limits.    BOWEL: Diffuse moderate concentric low-attenuation wall thickening of the   colon with moderate fluid infiltration of the mesentery. No pneumatosis   or extraluminal gas. Small bowel loops are unremarkable with no evidence   of obstruction. Numerous prominent mesenteric lymph nodes, likely   reactive.  PERITONEUM: Mild to moderate free fluid throughout the peritoneal cavity.  VESSELS: Within normal limits.  RETROPERITONEUM/LYMPH NODES: No lymphadenopathy.  ABDOMINAL WALL: Within normal limits.  BONES: Healed left humeral neck fracture with secondary degenerative   changes of the humeral head.      IMPRESSION:  1. Multilobar pneumonia.  2. Mild to moderate pneumomediastinum extending into the neck.  3. Pancolitis, likely infectious.  4. Cirrhotic liver with ascites.  5. Results discussed with Dr. Faria at time of interpretation.    --- End of Report ---            SARA CONCEPCION MD; Attending Radiologist  This document has been electronically signed. Aug  9 2022  2:59PM    ACC: [id number] EXAM:  ECHO TTE WITH CON COMP W DOPP                          PROCEDURE DATE:  2022          INTERPRETATION:  TRANSTHORACIC ECHOCARDIOGRAM REPORT        Patient Name:   STEPHANIE BHATTI Patient Location: [facility]  Medical Rec #:  [id number]           Accession #:      [id number]  Account #:                         Height:           61.8 in 157.0 cm  YOB: 1950           Weight:           103.6 lb 47.00 kg  Patient Age:    72 years           BSA:              1.44 m²  Patient Gender: F                  BP:               123/60 mmHg      Date of Exam:        2022 12:36:55 PM  Sonographer:         Yeni Gallagher  Referring Physician: Blanca Reinoso    Procedure:     2D Echo/Doppler/Color Doppler Complete.  Indications:   Cardiomyopathy, unspecified - I42.9  Diagnosis:     Cardiomyopathy, unspecified - I42.9  Study Details: Technically difficult study. Study quality was adversely   affected                 due to the patient being uncooperative.        2D AND M-MODE MEASUREMENTS (normal ranges within parentheses):  Left                 Normal   Aorta/Left            Normal  Ventricle:                    Atrium:  IVSd (2D):    0.76  (0.7-1.1) Aortic Root    2.63  (2.4-3.7)                 cm             (2D):           cm  LVPWd (2D):   0.85  (0.7-1.1) Left Atrium    3.62  (1.9-4.0)                 cm             (2D):           cm  LVIDd (2D):   4.13  (3.4-5.7) LA Volume      47.5                 cm             Index         ml/m²  LVIDs (2D):   2.08            Right Ventricle:                 cm             TAPSE:           1.86 cm  LV FS (2D):   49.6   (>25%)                  %  Relative Wall 0.41   (<0.42)  Thickness    LV SYSTOLIC FUNCTION BY 2D PLANIMETRY (MOD):  EF-A4C View: 62.2 % EF-A2C View: 64.3 % EF-Biplane: 65 %    SPECTRAL DOPPLER ANALYSIS (where applicable):  Aortic Valve: AoV Max Alessio: 2.09 m/s AoV Peak P.4 mmHg AoV Mean P.1 mmHg    LVOT Vmax: 1.82 m/s LVOT VTI: 0.319 m LVOT Diameter: 1.84 cm    AoV Area, Vmax: 2.31 cm² AoV Area, VTI: 2.10 cm² AoV Area, Vmn: 1.96 cm²  Ao VTI: 0.405  Tricuspid Valve and PA/RV Systolic Pressure: TR Max Velocity: 2.53 m/s RA   Pressure: 3 mmHg RVSP/PASP: 28.6 mmHg      PHYSICIAN INTERPRETATION:  Left Ventricle: The left ventricular internal cavity size is normal. Left   ventricular wall thickness is normal.  Global LV systolic function was normal. Left ventricular ejection   fraction, by visual estimation, is 60 to 65%. The mitral in-flow pattern   reveals no discernable A-wave, therefore no comment on diastolic function   can be made.  Right Ventricle: Normal right ventricular size and function.  Left Atrium: Moderately enlarged left atrium.  Right Atrium: Mildly enlarged right atrium. Prominent Eustachean valve.  Pericardium: There is no evidence of pericardial effusion. There is a   small pleural effusion in the right lateral region.  Mitral Valve: Structurally normal mitral valve, with normal leaflet   excursion. Mild thickening and calcification of the anterior and   posterior mitral valve leaflets. Mild mitral valve regurgitation is seen.   The MR jet is centrally-directed.  Tricuspid Valve: The tricuspid valve is normal in structure.   Mild-moderate tricuspid regurgitation is visualized.  Aortic Valve: The aortic valve is trileaflet. No evidence of aortic   stenosis. Sclerotic aortic valve with normal opening. No evidence of   aortic valve regurgitation is seen.  Pulmonic Valve: The pulmonic valve was not well visualized.  Aorta: The aortic root and ascending aorta are structurally normal, with   no evidence of dilitation.  Venous: The inferior vena cava was normal sized, with respiratory size   variation greater than 50%.      Summary:   1. Technically difficult study.   2. Left ventricular ejection fraction, by visual estimation, is 60 to   65%.   3. Normal global left ventricular systolic function.   4. The mitral in-flow pattern reveals no discernable A-wave, therefore   no comment on diastolic function can be made.   5. Normal left ventricular internal cavity size.   6. Normal right ventricular size and function.   7. Moderately enlarged left atrium.   8. Mildly enlarged right atrium.   9. Mild thickening and calcification of the anterior and posterior   mitral valve leaflets.  10. Mild mitral valve regurgitation.  11. Sclerotic aortic valve with normal opening.  12. Mild-moderate tricuspid regurgitation.  13. There is no evidence of pericardial effusion.    MD Verónica Electronically signed on 2022 at 5:13:13 PM    CRITICAL CARE TIME SPENT: 35 mins assessing presenting problems of acute illness that poses high probability of life threatening deterioration or end organ damage/dysfunction.  Medical decision making including Initiating plan of care, reviewing data, reviewing radiology, direct patient bedside evaluation and interpretation of vital signs, any necessary ventilator management , discussion with multidisciplinary team, all non inclusive of other procedural time.

## 2022-08-10 NOTE — PROGRESS NOTE ADULT - ASSESSMENT
73 yo female with hx of Hep C, cirrhosis, HTN, depression, asthma, former smoker, [place] [facility] [place] [facility] [place] with acute respiratory failure requiring intubation, possible hemoptysis vs hematemesis.  CT chest showed left upper lobe consolidation/pneumonia, scattered basilar airspace opacities, found to have Klebsiella in sputum. Patient extubated to bipap on 8/7. Transferred from CTICU to MICU service on 8/8.  Re-intubated on 8/9.      Acute respiratory failure/ Klebsiella pneumonia/ asthma exacerbation   - zosyn (klebsiella is susceptible)  - continue mechanical ventilation, weaning down fio2  - bronchodilators, steroids    Diffuse colitis on CT abdomen  - on empiric abx  - no diarrhea  discussed with radiology, less likely ischemic, appear to be infectious based on distribution     delirium  - prn zyprexa    RENITA   - gentle hydration     Hep C cirrhosis  - lactulose, monitor ammonia    Sedation/Analgesia: propofol, prn fentanyl   Vasoactive medications: norepi  DVT prophylaxis: SCDs  GI prophylaxis: protonix  Nutrition: tube feeds  Central line: left IJ TLC  Mortensen:  present  Mobility: bedrest   Family/Patient engagement/GOC:  updated daily, he has covid and can't come in to the hospital

## 2022-08-10 NOTE — PROGRESS NOTE ADULT - TIME BILLING
cc time spent titrating vent settings, vasoactive drips, IV sedatives, and adjusting other life sustaining therapies
coordinating care with MICU PA/resident, MICU RN, counseling patient.

## 2022-08-10 NOTE — PROGRESS NOTE ADULT - SUBJECTIVE AND OBJECTIVE BOX
Interval HPI:  intubated yesterday evening with worsening lactate and work of breathing    Exam:  intubated and sedated  reg rhythm  bilateral air entry, mild crepitus over neck  abd soft  trace edema    Radiology: CT chest - bilateral consolidation and ground glass opacities, pnuemomediastinum,   CT abd- diffuse colitis     On Admission  08-06-22 (4d)  HPI:  71 year old female with PMHx of hypertension, depression, asthma, former smoker 10 pk years, presented to [facility] after seeing PCP with hypoxia.  She recently moved and then became SOB, lethargic and anorexic prompting her visit to PCP.  Seemingly unrelated, had a recent fall out of bed with no LOC and no medical treatment but does have bruising mostly on right side.  She arrived at [facility] on 8/5/22.  She had head CT which was negative for acute bleed or ischemia, CT negative for PE, lower extremity doppler negative for DVT.  She was hypoxic, placed on nasal canula, then had episode of " bloody vomit".  She was emergently intubated with reports of very difficult and friable airway.  Showed signs of hemoptysis, blood in mouth and coming from ET tube.  Right femoral arterial and triple lumen catheters were placed. She was sedated on propofol given blood and platelets.  She was transferred to [facility] for Bronchoscopy and EGD planned for 8/7/2022.   (06 Aug 2022 14:16)    PAST MEDICAL & SURGICAL HISTORY:  Major Depressive Disorder      OCD (Obsessive Compulsive Disorder)      Anxiety      Hepatitis C      Cirrhosis      Hypertension      Acute asthma exacerbation      S/P Liver Biopsy          Antimicrobial:  piperacillin/tazobactam IVPB.. 3.375 Gram(s) IV Intermittent every 12 hours    Cardiovascular:  norepinephrine Infusion 0.05 MICROgram(s)/kG/Min IV Continuous <Continuous>    Pulmonary:  albuterol/ipratropium for Nebulization 3 milliLiter(s) Nebulizer every 6 hours    Hematalogic:    Other:  chlorhexidine 0.12% Liquid 15 milliLiter(s) Oral Mucosa every 12 hours  chlorhexidine 2% Cloths 1 Application(s) Topical two times a day  fentaNYL    Injectable 50 MICROGram(s) IV Push every 4 hours PRN  insulin glargine Injectable (LANTUS) 5 Unit(s) SubCutaneous every morning  insulin lispro (ADMELOG) corrective regimen sliding scale   SubCutaneous every 6 hours  lactated ringers. 1000 milliLiter(s) IV Continuous <Continuous>  methylPREDNISolone sodium succinate Injectable 20 milliGRAM(s) IV Push every 12 hours  OLANZapine Injectable 2.5 milliGRAM(s) IntraMuscular every 6 hours PRN  pantoprazole  Injectable 40 milliGRAM(s) IV Push every 12 hours  propofol Infusion 15 MICROgram(s)/kG/Min IV Continuous <Continuous>  sodium chloride 0.9% lock flush 3 milliLiter(s) IV Push every 8 hours      Drug Dosing Weight  Height (cm): 157.4 (06 Aug 2022 12:20)  Weight (kg): 47.2 (06 Aug 2022 12:20)  BMI (kg/m2): 19.1 (06 Aug 2022 12:20)  BSA (m2): 1.45 (06 Aug 2022 12:20)    T(C): 35.9 (08-10-22 @ 09:00), Max: 37.2 (08-09-22 @ 13:30)  HR: 69 (08-10-22 @ 09:00)  BP: 120/57 (08-10-22 @ 09:00)  BP(mean): 76 (08-10-22 @ 09:00)  ABP: --  ABP(mean): --  RR: 15 (08-10-22 @ 09:00)  SpO2: 100% (08-10-22 @ 09:00)    ABG - ( 09 Aug 2022 17:55 )  pH, Arterial: 7.340 pH, Blood: x     /  pCO2: 32    /  pO2: 72    / HCO3: 17    / Base Excess: -8.5  /  SaO2: 96.5                  08-09 @ 07:01  -  08-10 @ 07:00  --------------------------------------------------------  IN: 4323 mL / OUT: 985 mL / NET: 3338 mL        Mode: AC/ CMV (Assist Control/ Continuous Mandatory Ventilation)  RR (machine): 18  TV (machine): 400  FiO2: 60  PEEP: 5  ITime: 1  MAP: 12  PIP: 31        LABS:  CBC Full  -  ( 10 Aug 2022 04:20 )  WBC Count : 9.06 K/uL  RBC Count : 3.36 M/uL  Hemoglobin : 10.2 g/dL  Hematocrit : 31.0 %  Platelet Count - Automated : 54 K/uL  Mean Cell Volume : 92.3 fl  Mean Cell Hemoglobin : 30.4 pg  Mean Cell Hemoglobin Concentration : 32.9 gm/dL  Auto Neutrophil # : x  Auto Lymphocyte # : x  Auto Monocyte # : x  Auto Eosinophil # : x  Auto Basophil # : x  Auto Neutrophil % : x  Auto Lymphocyte % : x  Auto Monocyte % : x  Auto Eosinophil % : x  Auto Basophil % : x    08-10    147<H>  |  115<H>  |  75.0<H>  ----------------------------<  255<H>  4.1   |  19.0<L>  |  1.59<H>    Ca    6.3<LL>      10 Aug 2022 04:20  Phos  3.7     08-10  Mg     2.7     08-10          Culture Results:   Numerous Klebsiella pneumoniae  Normal Respiratory Estfeany absent (08-07 @ 16:30)    ____________________________________________________________________________________________________

## 2022-08-11 NOTE — PROGRESS NOTE ADULT - ASSESSMENT
Assessment     Septic shock , resolved  Multilobar PNA  RENITA, likely from sepsis  Pancolitis  Thrombocytopenia, multifactorial from cirrhosis and sepsis     Neuro:    C/W Propofol and Fentanyl for sedation   Goal RAAS of 0 to -1    Pulmonary:    Remains on mechanical ventilation   Wean off O2 as tolerated    Cardiology:    Monitor off vasopressors    GI:    PPI for stress ulcer prophylaxis   C/W tube feeding     Renal:    Avoid nephrotoxic drugs   UO adequate for now   D/C Mortensen , continue Is and Os    ID:    C/W Ceftriaxone and Flagyl  Would cover K. Pneumonia based on sensitivity and suspected infectious colitis     Endo:    Lantus and sliding scale   Target glucose 140 to 180     Hem:    SCD for DVT prophylaxis       Lines and catheters: TLC, Mortensen  Advances directives: Full code

## 2022-08-11 NOTE — CHART NOTE - NSCHARTNOTEFT_GEN_A_CORE
Source: Patient [ ]  Family [ ]   other [x ]    Current Diet: Diet, NPO with Tube Feed:   Tube Feeding Modality: Orogastric  Jevity 1.5 Jad (JEVITY1.5)  Total Volume for 24 Hours (mL): 960  Continuous  Starting Tube Feed Rate {mL per Hour}: 10  Increase Tube Feed Rate by (mL): 10     Every 4 hours  Until Goal Tube Feed Rate (mL per Hour): 40  Tube Feed Duration (in Hours): 24  Tube Feed Start Time: 08:00 (08-10-22 @ 08:00)    Enteral /Parenteral Nutrition: Tube feeds at goal rate of 40 ml/hr (x20 hrs) provides 800 ml, 1200 kcal, 51g protein, 608 ml free water.    Current Weight:   (8/11) 127.6 lbs  (8/10) 117.5 lbs  (8/9) 111.1 lbs  (8/8) 124.5 lbs  (8/7) 114.8 lbs  ? accuracy of weights, noted with 2+ generalized, b/l arm, and b/l hand edema, continue to trend and maintain strict Is&Os     Pertinent Medications: MEDICATIONS  (STANDING):  albuterol/ipratropium for Nebulization 3 milliLiter(s) Nebulizer every 6 hours  cefTRIAXone   IVPB 2000 milliGRAM(s) IV Intermittent every 24 hours  chlorhexidine 0.12% Liquid 15 milliLiter(s) Oral Mucosa every 12 hours  chlorhexidine 2% Cloths 1 Application(s) Topical two times a day  fentaNYL   Infusion. 0.5 MICROgram(s)/kG/Hr (2.36 mL/Hr) IV Continuous <Continuous>  insulin glargine Injectable (LANTUS) 5 Unit(s) SubCutaneous every morning  insulin lispro (ADMELOG) corrective regimen sliding scale   SubCutaneous every 6 hours  pantoprazole  Injectable 40 milliGRAM(s) IV Push every 12 hours  propofol Infusion 15 MICROgram(s)/kG/Min (4.25 mL/Hr) IV Continuous <Continuous>    MEDICATIONS  (PRN):  OLANZapine Injectable 2.5 milliGRAM(s) IntraMuscular every 6 hours PRN agitation    Pertinent Labs: CBC Full  -  ( 11 Aug 2022 04:33 )  WBC Count : 4.12 K/uL  RBC Count : 3.13 M/uL  Hemoglobin : 9.6 g/dL  Hematocrit : 29.2 %  Platelet Count - Automated : 28 K/uL  Mean Cell Volume : 93.3 fl  Mean Cell Hemoglobin : 30.7 pg  Mean Cell Hemoglobin Concentration : 32.9 gm/dL  Auto Neutrophil # : x  Auto Lymphocyte # : x  Auto Monocyte # : x  Auto Eosinophil # : x  Auto Basophil # : x  Auto Neutrophil % : x  Auto Lymphocyte % : x  Auto Monocyte % : x  Auto Eosinophil % : x  Auto Basophil % : x    08-11 Na148 mmol/L<H> Glu 207 mg/dL<H> K+ 4.9 mmol/L Cr  1.69 mg/dL<H> BUN 88.1 mg/dL<H> Phos 3.8 mg/dL Alb n/a   PAB n/a       Skin: Skin tear to left FA per documentation   Demarcus: 12    Nutrition focused physical exam previously conducted - found signs of malnutrition [ ]absent [ x]present    Subcutaneous fat loss: [x ] Orbital fat pads region, [x ]Buccal fat region, [ ]Triceps region,  [ ]Ribs region    Muscle wasting: [x ]Temples region, [x ]Clavicle region, [x ]Shoulder region, [ ]Scapula region, [ ]Interosseous region,  [ ]thigh region, [ ]Calf region    Estimated Needs:   [ x] no change since previous assessment  [ ] recalculated:     Current Nutrition Diagnosis: Pt remains at high nutrition risk secondary to malnutrition (severe, chronic) related to inadequate protein energy intake w/ increased needs in setting of COPD, cirrhosis, hepatitis, left upper lobe consolidation/pneumonia, scattered basilar airspace opacities, diffuse colitis as evidenced by meeting <75% nutrient needs >1 month, severe muscle loss of temples, clavicles, and shoulders, and severe fat loss of orbitals and buccal pads. Pt now intubated due to worsening respiratory failure. Jevity 1.5 ordered for goal rate of 40 ml/hr. Per flowsheet, pt received 354 ml propofol over the last 24 hrs (provides ~389 kcal); currently infusing at 17 ml/hr. RD to follow up.     Recommendations:   1) Suggest change tube feeds to Pivot 1.5 (semi-elemental); goal rate of 40 ml/hr (x20 hrs) to provide 800 ml, 1200 kcal, 75g protein, 607 ml free water. Additional free water per MD discretion.  2) Rx: liquid MVI daily.  3) Monitor tube feed tolerance.  4) Obtain daily weights to monitor trends.   5) Monitor blood sugars and correct as needed.    Monitoring and Evaluation:   [ ] PO intake [x] Tolerance to diet prescription [X] Weights  [X] Follow up per protocol [X] Labs

## 2022-08-11 NOTE — PROGRESS NOTE ADULT - SUBJECTIVE AND OBJECTIVE BOX
Patient is a 72y old  Female who presents with a chief complaint of Hypoxia, Hemoptysis (10 Aug 2022 10:10)      INTERVAL HPI/OVERNIGHT EVENTS:    T(C): 36.7, Max: 37.4 (08-10-22 @ 19:00)  HR: 62  BP: 131/58  RR: 30  SpO2: 94%    PHYSICAL EXAM:  GENERAL:   HEENT: Anicteric sclera, EOMI  CHEST/LUNG: Clear to auscultation bilaterally  HEART: Regular rate and rhythm; No murmurs, rubs, or gallops  ABDOMEN: Soft, Nontender, Nondistended; Bowel sounds present  EXTREMITIES: No clubbing, cyanosis, or edema      LABS:                          9.6    4.12  )-----------( 28       ( 11 Aug 2022 04:33 )             29.2     08-11    148<H>  |  117<H>  |  88.1<H>  ----------------------------<  207<H>  4.9   |  19.0<L>  |  1.69<H>    Ca    6.8<L>      11 Aug 2022 04:33  Phos  3.8     08-11  Mg     2.9     08-11            RADIOLOGY & ADDITIONAL TESTS:          albuterol/ipratropium for Nebulization 3 milliLiter(s) Nebulizer every 6 hours  cefTRIAXone   IVPB 2000 milliGRAM(s) IV Intermittent every 24 hours  chlorhexidine 0.12% Liquid 15 milliLiter(s) Oral Mucosa every 12 hours  chlorhexidine 2% Cloths 1 Application(s) Topical two times a day  fentaNYL   Infusion. 0.5 MICROgram(s)/kG/Hr IV Continuous <Continuous>  insulin glargine Injectable (LANTUS) 5 Unit(s) SubCutaneous every morning  insulin lispro (ADMELOG) corrective regimen sliding scale   SubCutaneous every 6 hours  metroNIDAZOLE  IVPB 500 milliGRAM(s) IV Intermittent every 8 hours  OLANZapine Injectable 2.5 milliGRAM(s) IntraMuscular every 6 hours PRN  pantoprazole  Injectable 40 milliGRAM(s) IV Push every 12 hours  propofol Infusion 15 MICROgram(s)/kG/Min IV Continuous <Continuous>

## 2022-08-11 NOTE — PROGRESS NOTE ADULT - SUBJECTIVE AND OBJECTIVE BOX
72y old  Female who presents with a chief complaint of Hypoxia and Hemoptysis (11 Aug 2022 12:38)      INTERVAL HPI/OVERNIGHT EVENTS:    Remains intubated     T(C): 36.7, Max: 37.4 (08-10-22 @ 19:00)  HR: 72  BP: 131/63  RR: 20  SpO2: 93%    PHYSICAL EXAM:  GENERAL: RAAS of 0   CHEST/LUNG: normal rate and effort  HEART: Regular rate and rhythm;   ABDOMEN: Soft, Nontender, Nondistended;   EXTREMITIES: No clubbing, cyanosis, or edema      LABS:                          9.6    4.12  )-----------( 28       ( 11 Aug 2022 04:33 )             29.2     08-11    148<H>  |  117<H>  |  88.1<H>  ----------------------------<  207<H>  4.9   |  19.0<L>  |  1.69<H>    Ca    6.8<L>      11 Aug 2022 04:33  Phos  3.8     08-11  Mg     2.9     08-11        RADIOLOGY & ADDITIONAL TESTS:    1. Multilobar pneumonia.  2. Mild to moderate pneumomediastinum extending into the neck.  3. Pancolitis, likely infectious.  4. Cirrhotic liver with ascites.    Meds:    albuterol/ipratropium for Nebulization 3 milliLiter(s) Nebulizer every 6 hours  cefTRIAXone   IVPB 2000 milliGRAM(s) IV Intermittent every 24 hours  chlorhexidine 0.12% Liquid 15 milliLiter(s) Oral Mucosa every 12 hours  chlorhexidine 2% Cloths 1 Application(s) Topical two times a day  fentaNYL   Infusion. 0.5 MICROgram(s)/kG/Hr IV Continuous <Continuous>  insulin glargine Injectable (LANTUS) 5 Unit(s) SubCutaneous every morning  insulin lispro (ADMELOG) corrective regimen sliding scale   SubCutaneous every 6 hours  metroNIDAZOLE  IVPB 500 milliGRAM(s) IV Intermittent every 8 hours  OLANZapine Injectable 2.5 milliGRAM(s) IntraMuscular every 6 hours PRN  pantoprazole  Injectable 40 milliGRAM(s) IV Push every 12 hours  propofol Infusion 15 MICROgram(s)/kG/Min IV Continuous <Continuous>

## 2022-08-12 NOTE — PROGRESS NOTE ADULT - SUBJECTIVE AND OBJECTIVE BOX
Patient is a 72y old  Female who presents with a chief complaint of Hypoxia, Hemoptysis (11 Aug 2022 13:58)    BRIEF HOSPITAL COURSE:   71 yo female with hx of Hep C, cirrhosis, HTN, depression, asthma, former smoker, transferred from [facility] to [facility] CTICU on  with acute respiratory failure requiring intubation, possible hemoptysis vs hematemesis.  CT chest showed left upper lobe consolidation/pneumonia, scattered basilar airspace opacities, found to have Klebsiella in sputum. Patient extubated to bipap on . Transferred from CTICU to MICU service on .  Re-intubated on .    Events last 24 hours:   -On SAT this morning, became hypoxic and tachypneic requiring reinitiation of Propofol and Fentanyl infusions.   -Recurrent SVT this morning, given Adenosine 6mg IVPx1, 12mg IVPx1 and Cardizem 15mg IVPx1 w/ transient response. Required initiation of Esmolol infusion.   -Remains off vasopressors.   -Ammonia level 81, Lactulose started.     PAST MEDICAL & SURGICAL HISTORY:  Major Depressive Disorder  OCD (Obsessive Compulsive Disorder)  Anxiety  Hepatitis C  Cirrhosis  Hypertension  Acute asthma exacerbation  S/P Liver Biopsy    Review of Systems:  Due to pt being intubated/sedated, subjective information was not able to be obtained from the patient. History was obtained, to the extent possible, from review of the chart and collateral sources of information.     Medications:  cefTRIAXone   IVPB 2000 milliGRAM(s) IV Intermittent every 24 hours  metroNIDAZOLE  IVPB 500 milliGRAM(s) IV Intermittent every 8 hours  esMOLOL  Infusion 50 MICROgram(s)/kG/Min IV Continuous <Continuous>  albuterol/ipratropium for Nebulization 3 milliLiter(s) Nebulizer every 6 hours  fentaNYL   Infusion. 0.5 MICROgram(s)/kG/Hr IV Continuous <Continuous>  OLANZapine Injectable 2.5 milliGRAM(s) IntraMuscular every 6 hours PRN  propofol Infusion 15 MICROgram(s)/kG/Min IV Continuous <Continuous>  lactulose Syrup 20 Gram(s) Oral every 12 hours  pantoprazole  Injectable 40 milliGRAM(s) IV Push daily  insulin glargine Injectable (LANTUS) 5 Unit(s) SubCutaneous every morning  insulin lispro (ADMELOG) corrective regimen sliding scale   SubCutaneous every 6 hours  lactated ringers Bolus 1000 milliLiter(s) IV Bolus once  chlorhexidine 0.12% Liquid 15 milliLiter(s) Oral Mucosa every 12 hours  chlorhexidine 2% Cloths 1 Application(s) Topical two times a day    Mode: AC/ CMV (Assist Control/ Continuous Mandatory Ventilation)  RR (machine): 18  TV (machine): 350  FiO2: 50  PEEP: 5  ITime: 1  MAP: 8  PIP: 19    ICU Vital Signs Last 24 Hrs  T(C): 37.1 (12 Aug 2022 11:00), Max: 37.1 (12 Aug 2022 07:21)  T(F): 98.8 (12 Aug 2022 11:00), Max: 98.8 (12 Aug 2022 07:21)  HR: 154 (12 Aug 2022 11:00) (64 - 156)  BP: 102/70 (12 Aug 2022 11:00) (101/51 - 147/49)  BP(mean): 82 (12 Aug 2022 11:00) (62 - 92)  ABP: --  ABP(mean): --  RR: 20 (12 Aug 2022 06:00) (18 - 38)  SpO2: 94% (12 Aug 2022 11:00) (89% - 99%)    O2 Parameters below as of 12 Aug 2022 01:00  O2 Concentration (%): 50    ABG - ( 12 Aug 2022 10:44 )  pH, Arterial: 7.280 pH, Blood: x     /  pCO2: 48    /  pO2: 69    / HCO3: 23    / Base Excess: -4.1  /  SaO2: 96.3      I&O's Detail  11 Aug 2022 07:01  -  12 Aug 2022 07:00  --------------------------------------------------------  IN:    Enteral Tube Flush: 500 mL    FentaNYL: 215.1 mL    IV PiggyBack: 100 mL    IV PiggyBack: 50 mL    Jevity 1.5: 920 mL    Lactated Ringers: 75 mL    Lactated Ringers Bolus: 500 mL    Propofol: 210.1 mL  Total IN: 2570.2 mL  OUT:    Indwelling Catheter - Urethral (mL): 990 mL    Norepinephrine: 0 mL  Total OUT: 990 mL  Total NET: 1580.2 mL    12 Aug 2022 07:01  -  12 Aug 2022 12:36  --------------------------------------------------------  IN:    Esmolol: 14.2 mL    FentaNYL: 7.2 mL    IV PiggyBack: 20 mL    Propofol: 32.5 mL  Total IN: 73.9 mL  OUT:    Indwelling Catheter - Urethral (mL): 70 mL  Total OUT: 70 mL  Total NET: 3.9 mL    LABS:                      10.5   6.38  )-----------( 31       ( 12 Aug 2022 03:30 )             33.5     08-12  147<H>  |  117<H>  |  93.2<H>  ----------------------------<  134<H>  5.1   |  19.0<L>  |  1.88<H>  Ca    7.1<L>      12 Aug 2022 03:30  Phos  5.1     08-12  Mg     2.9     -12  TPro  5.7<L>  /  Alb  2.0<L>  /  TBili  3.2<H>  /  DBili  x   /  AST  35<H>  /  ALT  28  /  AlkPhos  62  08-12    CARDIAC MARKERS ( 11 Aug 2022 04:33 )  x     / x     / 93 U/L / x     / x        CAPILLARY BLOOD GLUCOSE  POCT Blood Glucose.: 109 mg/dL (12 Aug 2022 11:45)    Urinalysis Basic - ( 10 Aug 2022 23:00 )  Color: Yellow / Appearance: Clear / S.015 / pH: x  Gluc: x / Ketone: Trace  / Bili: Negative / Urobili: 4 mg/dL   Blood: x / Protein: Negative / Nitrite: Negative   Leuk Esterase: Trace / RBC: 11-25 /HPF / WBC 0-2 /HPF   Sq Epi: x / Non Sq Epi: Occasional / Bacteria: Occasional    CULTURES:  Rapid RVP Result: NotDetec (22 @ 02:15)  Culture Results:   Numerous Klebsiella pneumoniae  Normal Respiratory Estefany absent (22 @ 16:30)  Culture Results:   No Growth Final (22 @ 22:00)  Culture Results:   No Growth Final (22 @ 22:00)  Culture Results:   Numerous Klebsiella pneumoniae  Normal Respiratory Estefany absent (22 @ 21:30)    Physical Examination:  General: +intubated.  HEENT: PERRL.  PULM: Clear to auscultation bilaterally.  CVS: s1/s2, tachycardic.   ABD: Soft, nondistended, nontender, normoactive bowel sounds.  EXT: diffuse anasarca  SKIN: Warm.  NEURO: +Sedated.    RADIOLOGY:   < from: CT Abdomen and Pelvis No Cont (22 @ 14:48) >  ACC: [id number] EXAM:  CT CHEST                        ACC: [id number] EXAM:  CT ABDOMEN AND PELVIS                        PROCEDURE DATE:  2022    IMPRESSION:  1. Multilobar pneumonia.  2. Mild to moderate pneumomediastinum extending into the neck.  3. Pancolitis, likely infectious.  4. Cirrhotic liver with ascites.  5. Results discussed with Dr. Faria at time of interpretation.      71 yo female with hx of Hep C, cirrhosis, HTN, depression, asthma, former smoker, transferred from [facility] to [facility] CTICU on  with acute respiratory failure requiring intubation, possible hemoptysis vs hematemesis.  CT chest showed left upper lobe consolidation/pneumonia, scattered basilar airspace opacities, found to have Klebsiella in sputum. Patient extubated to bipap on . Transferred from CTICU to MICU service on .  Re-intubated on .  Assessment:  1. Acute hypoxic respiratory failure  2. Klebsiella PNA  3. Acute Renal Failure  4. Pancolitis  5. Thrombocytopenia  6. Hyperammonemia    Plan:  NEURO:  -Sedated w/ Propofol and Fentanyl infusions, goal RASS 0 to -1.     CV:  -Maintain goal MAP >65.  -Keep K~4 and Mg>2 for optimal arrhythmia suppression.  -Multiple episodes of SVT, requiring initiation of Esmolol infusion. Goal HR <100.    RESP:  -Patient currently on Full vent support  -titrate settings to maintain SaO2 >90%, or pH >7.25  -low tidal volume ventilation strategy w/ goal Tv 4-6 cc/kg of ideal body weight  -plateu pressure goal <30  -Peridex oral care  -aggressive pulmonary toilet  -daily sedation vacation with spontaneous breathing trial if clinical condition warrants, discuss with respiratory therapy   -Albuterol PRN.     RENAL:  -Renal function currently w/ RENITA.  -trend lytes/Scr daily with BMP  -I's and O's, goal UOP 0.5 cc/kg/hr  -renal dose meds and avoid nephrotoxins   -Free water increased to 300cc q4h.     GI:  -TF.  -Protonix QD.   -Lactulose 20g q12h added, trend Ammonia level.     ENDO:  -Aggressive glycemic control to limit FS glucose to <180mg/dl. ISS, Lantus.     ID:  -Rocephin, Flagyl.     HEME:  -DVT ppx w/ SCDs in setting of thrombocytopenia (2/2 sepsis, cirrhosis). Transfuse for Plt count <10k or <50k w/ active bleeding.     DISPO: Critically ill. Full Code. D/w ICU intensivist Dr. Man.     CRITICAL CARE TIME SPENT:  43 minutes of critical care time spent providing medical care for patient's acute illness/conditions that impairs at least one vital organ system and/or poses a high risk of imminent or life threatening deterioration in the patient's condition. It includes time spent evaluating and treating the patient's acute illness as well as time spent reviewing labs, radiology, discussing goals of care with patient and/or patient's family, and discussing the case with a multidisciplinary team, including the eICU, in an effort to prevent further life threatening deterioration or end organ damage. This time is independent of any procedures performed.

## 2022-08-12 NOTE — PROGRESS NOTE ADULT - NS PANP COMMENT GEN_ALL_CORE FT
AHRF, sepsis 2/2 Klebsiella PNA , along with hx of cirrhosis .  C/W ventilatory support . Esmolol drip started for SVT not responding to adenosine and Cardizem   Oliguric RENITA that did not respond to fluid challenge . Given 1 dose of IV furosemide of 20 mg

## 2022-08-13 NOTE — CONSULT NOTE ADULT - ASSESSMENT
IMPRESSION:  1.  Acute kidney injury 2/2 ischemic ATN in septic shock  2.  Hyperkalemia.  3.  Septic shock  4.  Fluid overload.  5.  Metabolic acidosis.  6.  Pneumonia  7.  Acute respiratory failure/ARDS on mechanical ventilation  8.  Hep C w/ cirrhosis.      RECOMMENDATIONS:  ·	Given Anuric ATN w/ hyperkalemia, hypoxia 100% FiO2, hyperkalemia, fluid overload and MA RRT was offered  ·	Discussed in detail w/  "Giuliano" who wished to proceed w/ CRRT  ·	Discussed w/ ICU attending and primary team  ·	Orders placed, will start once functional access is available  ·	Ventilator, pressor, Abx management per primary    Prognosis extremely guarded.   I would like to thank  for this consultation.   Will continue to follow patient along with him.

## 2022-08-13 NOTE — PROGRESS NOTE ADULT - SUBJECTIVE AND OBJECTIVE BOX
Patient is a 72y old  Female who presents with a chief complaint of Hypoxia, Hemoptysis (12 Aug 2022 12:36)      BRIEF HOSPITAL COURSE: 71 y/o F with a h/o Hep C, cirrhosis, HTN, depression, asthma, former smoker, transferred from [facility] to [facility] CTICU on 8/6 with acute respiratory failure requiring intubation, possible hemoptysis vs hematemesis.  CT chest showed left upper lobe consolidation/pneumonia, scattered basilar airspace opacities, found to have Klebsiella in sputum. Patient extubated to bipap on 8/7. Transferred from CTICU to MICU service on 8/8.  Re-intubated on 8/9.      Events last 24 hours: Now with worsening renal failure oliguria, and hyperkalemia. Significant third spacing and generalized weeping anasarca. Also with severe hypoxemia, now on 100% FiO2. Esmolol gtt ongoing for recurrent SVT.        PAST MEDICAL & SURGICAL HISTORY:  Major Depressive Disorder      OCD (Obsessive Compulsive Disorder)      Anxiety      Hepatitis C      Cirrhosis      Hypertension      Acute asthma exacerbation      S/P Liver Biopsy          Review of Systems:  Unable to obtain secondary to sedation/intubation.    Medications:  cefTRIAXone   IVPB 2000 milliGRAM(s) IV Intermittent every 24 hours  metroNIDAZOLE  IVPB 500 milliGRAM(s) IV Intermittent every 8 hours  esMOLOL  Infusion 50 MICROgram(s)/kG/Min IV Continuous <Continuous>  albuterol/ipratropium for Nebulization 3 milliLiter(s) Nebulizer every 6 hours  fentaNYL   Infusion. 0.5 MICROgram(s)/kG/Hr IV Continuous <Continuous>  OLANZapine Injectable 2.5 milliGRAM(s) IntraMuscular every 6 hours PRN  propofol Infusion 15 MICROgram(s)/kG/Min IV Continuous <Continuous>  lactulose Syrup 20 Gram(s) Oral every 12 hours  pantoprazole  Injectable 40 milliGRAM(s) IV Push daily  dextrose 50% Injectable 25 milliLiter(s) IV Push once  insulin glargine Injectable (LANTUS) 5 Unit(s) SubCutaneous every morning  insulin lispro (ADMELOG) corrective regimen sliding scale   SubCutaneous every 6 hours  insulin regular  human recombinant. 10 Unit(s) IV Push once  albumin human 25% IVPB 50 milliLiter(s) IV Intermittent every 8 hours  calcium gluconate IVPB 2 Gram(s) IV Intermittent once  sodium bicarbonate  Injectable 50 milliEquivalent(s) IV Push once  chlorhexidine 0.12% Liquid 15 milliLiter(s) Oral Mucosa every 12 hours  chlorhexidine 2% Cloths 1 Application(s) Topical two times a day  sodium zirconium cyclosilicate 10 Gram(s) Oral once      Mode: AC/ CMV (Assist Control/ Continuous Mandatory Ventilation)  RR (machine): 18  TV (machine): 350  FiO2: 100  PEEP: 5  ITime: 1  MAP: 7  PIP: 22      ICU Vital Signs Last 24 Hrs  T(C): 36.1 (13 Aug 2022 05:30), Max: 37.1 (12 Aug 2022 07:21)  T(F): 97 (13 Aug 2022 05:30), Max: 98.8 (12 Aug 2022 07:21)  HR: 118 (13 Aug 2022 05:39) (57 - 156)  BP: 104/82 (13 Aug 2022 05:30) (92/46 - 147/49)  BP(mean): 90 (13 Aug 2022 05:30) (60 - 90)  ABP: --  ABP(mean): --  RR: 18 (13 Aug 2022 05:00) (18 - 20)  SpO2: 90% (13 Aug 2022 05:39) (88% - 96%)    O2 Parameters below as of 12 Aug 2022 20:46  Patient On (Oxygen Delivery Method): ventilator            ABG - ( 12 Aug 2022 10:44 )  pH, Arterial: 7.280 pH, Blood: x     /  pCO2: 48    /  pO2: 69    / HCO3: 23    / Base Excess: -4.1  /  SaO2: 96.3                I&O's Detail    11 Aug 2022 07:01  -  12 Aug 2022 07:00  --------------------------------------------------------  IN:    Enteral Tube Flush: 500 mL    FentaNYL: 215.1 mL    IV PiggyBack: 100 mL    IV PiggyBack: 50 mL    Jevity 1.5: 920 mL    Lactated Ringers: 75 mL    Lactated Ringers Bolus: 500 mL    Propofol: 210.1 mL  Total IN: 2570.2 mL    OUT:    Indwelling Catheter - Urethral (mL): 990 mL    Norepinephrine: 0 mL  Total OUT: 990 mL    Total NET: 1580.2 mL      12 Aug 2022 07:01  -  13 Aug 2022 05:52  --------------------------------------------------------  IN:    Enteral Tube Flush: 1800 mL    Esmolol: 346.2 mL    FentaNYL: 46.4 mL    IV PiggyBack: 90 mL    IV PiggyBack: 200 mL    IV PiggyBack: 50 mL    Jevity 1.5: 850 mL    Lactated Ringers Bolus: 1000 mL    Propofol: 180.5 mL  Total IN: 4563.1 mL    OUT:    Indwelling Catheter - Urethral (mL): 230 mL  Total OUT: 230 mL    Total NET: 4333.1 mL            LABS:                        10.5   7.04  )-----------( 39       ( 13 Aug 2022 05:00 )             35.2     08-13    140  |  109<H>  |  105.2<H>  ----------------------------<  160<H>  5.8<H>   |  19.0<L>  |  2.24<H>    Ca    7.8<L>      13 Aug 2022 05:00  Phos  6.8     08-13  Mg     2.7     08-13    TPro  5.5<L>  /  Alb  1.8<L>  /  TBili  3.9<H>  /  DBili  x   /  AST  34<H>  /  ALT  25  /  AlkPhos  69  08-12          CAPILLARY BLOOD GLUCOSE      POCT Blood Glucose.: 191 mg/dL (12 Aug 2022 23:46)        CULTURES:  Rapid RVP Result: NotDetec (08-08-22 @ 02:15)  Culture Results:   Numerous Klebsiella pneumoniae  Normal Respiratory Estefany absent (08-07-22 @ 16:30)  Culture Results:   No Growth Final (08-06-22 @ 22:00)  Culture Results:   No Growth Final (08-06-22 @ 22:00)  Culture Results:   Numerous Klebsiella pneumoniae  Normal Respiratory Estefany absent (08-06-22 @ 21:30)        Physical Examination:    General: No acute distress. sedated, intubated    HEENT: Pupils equal, reactive to light.  Symmetric.    PULM: diminished bilaterally with coarse breath sounds diffusely, no significant sputum production    CVS: tachycardic, reg rhythm, no murmurs, rubs, or gallops    ABD: Soft, nondistended, nontender, normoactive bowel sounds, no masses    EXT: generalized weeping anasarca    SKIN: Warm and well perfused, no rashes noted.    NEURO: sedated, will open eyes when sedation is weaned but does not follow commands        RADIOLOGY:     < from: CT Abdomen and Pelvis No Cont (08.09.22 @ 14:48) >  FINDINGS:  CHEST:  LUNGS AND LARGE AIRWAYS: Patent central airways. Endotracheal tube in   place. There is rounded consolidative opacity with air bronchograms in   the right lower lobe posteriorly. There are and geographic groundglass   and consolidative opacities throughout the upper lobes and right middle   lobe and mild consolidative opacity in the left lower lobe with adjacent   groundglass density.  PLEURA: Mild layering right pleural effusion and trace layering left   pleural effusion. There is mild to moderate superior pneumomediastinum   extending into the neck.  VESSELS: Within normal limits.  HEART: Heart size is normal. No pericardial effusion.  MEDIASTINUM AND TOM: No lymphadenopathy.  CHEST WALL AND LOWER NECK: Within normal limits.    ABDOMEN AND PELVIS:  LIVER: Shrunken nodular liver, compatible with end-stage cirrhosis.  BILE DUCTS: Normal caliber.  GALLBLADDER: Layering small gallstones.  SPLEEN: Within normal limits.  PANCREAS: Within normal limits.  ADRENALS: Within normal limits.  KIDNEYS/URETERS: Within normal limits.    BLADDER: Mortensen catheter in the collapsed bladder.  REPRODUCTIVE ORGANS: Uterus and adnexa within normal limits.    BOWEL: Diffuse moderate concentric low-attenuation wall thickening of the   colon with moderate fluid infiltration of the mesentery. No pneumatosis   or extraluminal gas. Small bowel loops are unremarkable with no evidence   of obstruction. Numerous prominent mesenteric lymph nodes, likely   reactive.  PERITONEUM: Mild to moderate free fluid throughout the peritoneal cavity.  VESSELS: Within normal limits.  RETROPERITONEUM/LYMPH NODES: No lymphadenopathy.  ABDOMINAL WALL: Within normal limits.  BONES: Healed left humeral neck fracture with secondary degenerative   changes of the humeral head.      IMPRESSION:  1. Multilobar pneumonia.  2. Mild to moderate pneumomediastinum extending into the neck.  3. Pancolitis, likely infectious.  4. Cirrhotic liver with ascites.  5. Results discussed with Dr. Faria at time of interpretation.            CRITICAL CARE TIME SPENT: 41 mins  Time spent evaluating/treating patient with medical issues that pose a high risk for life threatening deterioration and/or end-organ damage, reviewing data/labs/imaging, discussing case with multidisciplinary team, discussing plan/goals of care with patient/family. Non-inclusive of procedure time.

## 2022-08-13 NOTE — PROGRESS NOTE ADULT - ASSESSMENT
71 y/o F with a h/o Hep C, cirrhosis, HTN, depression, asthma, former smoker, with:    Acute hypoxemic respiratory failure, klebsiella pneumonia, RENITA, oliguria, hyperkalemia, SVT.    - suspect worsening hypoxemia is related to vent desynchrony this morning, will get stat CXR to evaluate for progression of lung infiltrates  - actively titrating vent settings to maintain SpO2 > 92%, escalated to 100% FiO2  - sedate with propofol and fentanyl infusions to promote vent synchrony  - worsening renal failure over the past 24 hours, oliguric, hyperkalemic, concern for HRS  - will give IV calcium gluconate, sodium bicarbonate, insulin, dextrose and Lokelma stat  - start colloid resuscitation to increase intravascular osmotic pressure as she is massively third spacing fluid  - trend BUN/Cr, monitor lytes, acid-base balance, and UOP  - no indication for urgent hemodialysis at this time but remains at increased risk for this  - recurrent SVT of unclear etiology, increase esmolol gtt to 100mcg/kg/min, goal HR < 100  - continue empiric ceftriaxone and metronidazole    Unfortunately her prognosis is poor. Will consult palliative care.

## 2022-08-13 NOTE — CONSULT NOTE ADULT - SUBJECTIVE AND OBJECTIVE BOX
Patient is a 72y old  Female who presents with a chief complaint of Hemoptysis     (08 Aug 2022 10:51)      BRIEF HOSPITAL COURSE: 71 yo female with hx of hypertension, depression, asthma, former smoker 10 pk years, presented to [facility] after seeing PCP with hypoxia. At [facility] patient was found hypoxic and had questionable episode of blood vomit vs. hemoptysis leading to intubation for airway protection. Patient transferred to CTICU at [facility] for bronschoscopy and also for GI evaluation for potential EGD. Bronch performed by CT ICU and found to be negative. No hemoptysis. GI evaluation not suspicious for bloody vomiting. No plans for EGD at this time. This morning patient extubated in CT ICU, however patient still in distress, tachypneic and requiring BiPAP. MICU consulted to assume care of patient as there is no CT surgery needs at this time.    PAST MEDICAL & SURGICAL HISTORY:  Major Depressive Disorder      OCD (Obsessive Compulsive Disorder)      Anxiety      Hepatitis C      Cirrhosis      Hypertension      Acute asthma exacerbation      S/P Liver Biopsy        Allergies    phenothiazines (Unknown)    Intolerances      FAMILY HISTORY:  FH: type 2 diabetes (Father)        Review of Systems:  CONSTITUTIONAL: No fever, chills, or fatigue  EYES: No eye pain, visual disturbances, or discharge  ENMT:  No difficulty hearing, tinnitus, vertigo; No sinus or throat pain  NECK: No pain or stiffness  RESPIRATORY: No cough, wheezing, chills or hemoptysis; No shortness of breath  CARDIOVASCULAR: No chest pain, palpitations, dizziness, or leg swelling  GASTROINTESTINAL: No abdominal or epigastric pain. No nausea, vomiting, or hematemesis; No diarrhea or constipation. No melena or hematochezia.  GENITOURINARY: No dysuria, frequency, hematuria, or incontinence  NEUROLOGICAL: No headaches, memory loss, loss of strength, numbness, or tremors  SKIN: No itching, burning, rashes, or lesions   MUSCULOSKELETAL: No joint pain or swelling; No muscle, back, or extremity pain  PSYCHIATRIC: No depression, anxiety, mood swings, or difficulty sleeping      Social History: Unknown. , Giuliano [phone]      Medications:  piperacillin/tazobactam IVPB.. 3.375 Gram(s) IV Intermittent every 8 hours      acetylcysteine 20%  Inhalation 4 milliLiter(s) Inhalation every 6 hours  albuterol/ipratropium for Nebulization 3 milliLiter(s) Nebulizer every 6 hours    dexMEDEtomidine Infusion 0.5 MICROgram(s)/kG/Hr IV Continuous <Continuous>  sertraline 50 milliGRAM(s) Oral daily        lactulose Syrup 15 Gram(s) Oral three times a day  pantoprazole  Injectable 40 milliGRAM(s) IV Push every 12 hours        sodium chloride 0.9% lock flush 3 milliLiter(s) IV Push every 8 hours      chlorhexidine 2% Cloths 1 Application(s) Topical two times a day        Mode: AC/ CMV (Assist Control/ Continuous Mandatory Ventilation)  RR (machine): 10  TV (machine): 400  FiO2: 40  PEEP: 5  MAP: 12  PIP: 23      ICU Vital Signs Last 24 Hrs  T(C): 37.2 (08 Aug 2022 09:00), Max: 37.7 (08 Aug 2022 07:25)  T(F): 99 (08 Aug 2022 09:00), Max: 99.9 (08 Aug 2022 07:25)  HR: 71 (08 Aug 2022 11:24) (58 - 83)  BP: 128/59 (08 Aug 2022 08:45) (80/47 - 128/59)  BP(mean): 85 (08 Aug 2022 08:45) (59 - 86)  ABP: 119/56 (08 Aug 2022 10:30) (78/37 - 140/55)  ABP(mean): 80 (08 Aug 2022 10:30) (52 - 92)  RR: 37 (08 Aug 2022 10:30) (15 - 37)  SpO2: 99% (08 Aug 2022 11:24) (90% - 100%)    O2 Parameters below as of 08 Aug 2022 10:30  Patient On (Oxygen Delivery Method): nasal cannula  O2 Flow (L/min): 5        Vital Signs Last 24 Hrs  T(C): 37.2 (08 Aug 2022 09:00), Max: 37.7 (08 Aug 2022 07:25)  T(F): 99 (08 Aug 2022 09:00), Max: 99.9 (08 Aug 2022 07:25)  HR: 71 (08 Aug 2022 11:24) (58 - 83)  BP: 128/59 (08 Aug 2022 08:45) (80/47 - 128/59)  BP(mean): 85 (08 Aug 2022 08:45) (59 - 86)  RR: 37 (08 Aug 2022 10:30) (15 - 37)  SpO2: 99% (08 Aug 2022 11:24) (90% - 100%)    Parameters below as of 08 Aug 2022 10:30  Patient On (Oxygen Delivery Method): nasal cannula  O2 Flow (L/min): 5      ABG - ( 08 Aug 2022 08:31 )  pH, Arterial: 7.410 pH, Blood: x     /  pCO2: 30    /  pO2: 85    / HCO3: 19    / Base Excess: -5.6  /  SaO2: 98.4                I&O's Detail    07 Aug 2022 07:01  -  08 Aug 2022 07:00  --------------------------------------------------------  IN:    Dexmedetomidine: 23.6 mL    Enteral Tube Flush: 250 mL    IV PiggyBack: 100 mL    IV PiggyBack: 100 mL    IV PiggyBack: 100 mL    IV PiggyBack: 100 mL    Lactated Ringers: 1800 mL    Pantoprazole: 30 mL    Propofol: 237.3 mL  Total IN: 2740.9 mL    OUT:    Indwelling Catheter - Urethral (mL): 670 mL    Nasogastric/Oral tube (mL): 250 mL    Norepinephrine: 0 mL    T-Tube (mL): 50 mL    Voided (mL): 535 mL  Total OUT: 1505 mL    Total NET: 1235.9 mL      08 Aug 2022 07:01  -  08 Aug 2022 12:36  --------------------------------------------------------  IN:    IV PiggyBack: 100 mL    Lactated Ringers: 225 mL    Lactated Ringers Bolus: 500 mL  Total IN: 825 mL    OUT:    Dexmedetomidine: 0 mL    Indwelling Catheter - Urethral (mL): 165 mL    Nasogastric/Oral tube (mL): 100 mL  Total OUT: 265 mL    Total NET: 560 mL            LABS:                        11.4   6.67  )-----------( 48       ( 08 Aug 2022 02:18 )             34.2     08-08    147<H>  |  116<H>  |  73.1<H>  ----------------------------<  110<H>  4.4   |  19.0<L>  |  1.48<H>    Ca    6.9<L>      08 Aug 2022 02:18  Phos  3.8       Mg     2.6         TPro  5.4<L>  /  Alb  1.9<L>  /  TBili  2.8<H>  /  DBili  2.3<H>  /  AST  45<H>  /  ALT  57<H>  /  AlkPhos  85        CARDIAC MARKERS ( 06 Aug 2022 18:50 )  x     / <0.01 ng/mL / 51 U/L / x     / x          CAPILLARY BLOOD GLUCOSE      POCT Blood Glucose.: 97 mg/dL (07 Aug 2022 17:18)    PT/INR - ( 07 Aug 2022 20:45 )   PT: 14.0 sec;   INR: 1.20 ratio         PTT - ( 07 Aug 2022 20:45 )  PTT:37.3 sec  Urinalysis Basic - ( 07 Aug 2022 20:45 )    Color: Yellow / Appearance: Clear / S.020 / pH: x  Gluc: x / Ketone: Trace  / Bili: Negative / Urobili: 4   Blood: x / Protein: Negative / Nitrite: Positive   Leuk Esterase: Moderate / RBC: 3-5 /HPF / WBC 6-10 /HPF   Sq Epi: x / Non Sq Epi: Occasional / Bacteria: Few      CULTURES:  Culture Results:   No growth to date. ( @ 22:00)  Culture Results:   No growth to date. ( @ 22:00)  Culture Results:   Numerous Klebsiella pneumoniae ( @ 21:30)        Physical Examination:    General: Moderate distress. Tired and uncomfortable appearing.    HEENT: Pupils equal, reactive to light.  Symmetric.    PULM: Tachypnea, Lungs clear to auscultation bilaterally. Trachea midline.    CVS: Irregular rate and Rhythm, no murmurs, rubs, or gallops    ABD: Soft, nondistended, nontender, normoactive bowel sounds, no masses    EXT: No edema, nontender    SKIN: Warm and well perfused, no rashes noted.    NEURO: A&Ox3, strength 3/5 all extremities, cranial nerves grossly intact, no focal deficits
REASON FOR CONSULTATION:  Elevated BUN and serum creatinine.    HISTORY OF PRESENT ILLNESS:  Patient is a 72y Female currently admitted in MICU w/ septic shock, Pneumonia, ARDS/Ac Resp failure on mechanical ventilation w/ 100% FiO2.  Patient anuric w/ only 10 cc UOP since AM. Labs significant for potassium of 5.8, , SC 2.24. Last ABG pH 7.280 pH, pCO2: 48,  pO2: 69.     REVIEW OF SYSTEM:  Unable to obtain 2/2 patient current clinical condition.     PAST MEDICAL AND SURGICAL HISTORY:   Major Depressive Disorder  OCD (Obsessive Compulsive Disorder)  Anxiety  Hepatitis C  Cirrhosis  Hypertension  Acute asthma exacerbation  S/P Liver Biopsy    ALLERGIES: phenothiazines (Unknown)    MEDICATIONS:  albumin human 25% IVPB 25 milliLiter(s) IV Intermittent every 6 hours  cefTRIAXone   IVPB 2000 milliGRAM(s) IV Intermittent every 24 hours  chlorhexidine 0.12% Liquid 15 milliLiter(s) Oral Mucosa every 12 hours  chlorhexidine 2% Cloths 1 Application(s) Topical two times a day  chlorhexidine 4% Liquid 1 Application(s) Topical <User Schedule>  CRRT Treatment    <Continuous>  esMOLOL  Infusion 50 MICROgram(s)/kG/Min IV Continuous <Continuous>  fentaNYL   Infusion. 0.5 MICROgram(s)/kG/Hr IV Continuous <Continuous>  heparin  Infusion Syringe 300 Unit(s)/Hr CRRT <Continuous>  insulin glargine Injectable (LANTUS) 5 Unit(s) SubCutaneous every morning  insulin lispro (ADMELOG) corrective regimen sliding scale   SubCutaneous every 6 hours  lactulose Syrup 20 Gram(s) Oral every 6 hours  metroNIDAZOLE  IVPB 500 milliGRAM(s) IV Intermittent every 8 hours  norepinephrine Infusion 0.05 MICROgram(s)/kG/Min IV Continuous <Continuous>  pantoprazole  Injectable 40 milliGRAM(s) IV Push daily  propofol Infusion 15.007 MICROgram(s)/kG/Min IV Continuous <Continuous>  sodium chloride 0.9% lock flush 10 milliLiter(s) IV Push every 1 hour PRN    FAMILY HISTORY: FH: type 2 diabetes (Father)    SOCIAL HISTORY:   ex smoker 10 year pack history  previous marijuana  occasional drinker    VITALS:  T(F): 93.6 (21:30)  HR: 65 (21:30)  BP: 87/57 (21:30)  RR: 18 (21:30)  SpO2: 96% (21:30)    PHYSICAL EXAMINATION:   Gen: ill appearing, intubated  MS: sedated  Eyes: EOMI, no icterus  HENT: ETT+  CV: rhythm reg reg, rate normal, no m/g/r, no LE edema  Chest: Coarse/Dec BS   Abd: soft, non distended  Neuro: sedated, no myoclonus  Skin: dry, warm, no rash or jaundice  Mortensen+, Central line    LABS: CBC:            10.5   7.04  )-----------( 39       ( 08-13-22 @ 05:00 )             35.2     Chem:         ( 08-13-22 @ 05:00 )    140  |  109<H>  |  105.2<H>  ----------------------------<  160<H>  5.8<H>   |  19.0<L>  |  2.24<H>    Liver Functions: ( 08-12-22 @ 18:00 )  Alb: 1.8 g/dL / Pro: 5.5 g/dL / ALK PHOS: 69 U/L / ALT: 25 U/L / AST: 34 U/L / GGT: x          RADIOLOGY: reviewed  
HISTORY OF PRESENT ILLNESS: This is a 72y old woman with a past medical history significant for HCV cirrhosis, psych hx, HTN, ex-smoker, admitted to [facility] with hypoxia, prior fall. She either coughed or vomited blood (more likely coughed, based on bronch today with blood in the R lung, CT and ex-smoker hx) was emergently intubated (this was hard to do, difficult airway) and transferred here. GI consult for management of cirrhosis.   Pt intubated, sedated, no hx.   Spoke to RN, BP normal, making urine well, no blood in BMs.     ROS: A 14-point review of systems was completed and was otherwise negative save what was reported in the HPI.    PAST MEDICAL/SURGICAL HISTORY:  Major Depressive Disorder    OCD (Obsessive Compulsive Disorder)    Anxiety    Hepatitis C    Cirrhosis    Hypertension    Acute asthma exacerbation    S/P Liver Biopsy      SOCIAL HISTORY:  - TOBACCO: Denies  - ALCOHOL: Denies  - ILLICIT DRUG USE: Denies    FAMILY HISTORY:  No known history of gastrointestinal or liver disease;  FH: type 2 diabetes (Father)        HOME MEDICATIONS:  losartan 25 mg oral tablet: 1 tab(s) orally once a day (06 Aug 2022 15:19)  sertraline 50 mg oral tablet: 1 tab(s) orally once a day (06 Aug 2022 15:19)  Wixela Inhub 100 mcg-50 mcg inhalation powder: 1 puff(s) inhaled 2 times a day (06 Aug 2022 15:19)    INPATIENT MEDICATIONS:  MEDICATIONS  (STANDING):  chlorhexidine 0.12% Liquid 15 milliLiter(s) Oral Mucosa every 12 hours  chlorhexidine 2% Cloths 1 Application(s) Topical two times a day  lactated ringers. 1000 milliLiter(s) (75 mL/Hr) IV Continuous <Continuous>  pantoprazole  Injectable 40 milliGRAM(s) IV Push every 12 hours  propofol Infusion 10 MICROgram(s)/kG/Min (2.83 mL/Hr) IV Continuous <Continuous>  sertraline 50 milliGRAM(s) Oral daily  sodium chloride 0.9% lock flush 3 milliLiter(s) IV Push every 8 hours    MEDICATIONS  (PRN):    ALLERGIES:  phenothiazines (Unknown)    T(C): 37.2 (22 @ 13:00), Max: 37.2 (22 @ 07:00)  HR: 68 (22 @ 14:00) (57 - 76)  BP: --  RR: 22 (22 @ 14:00) (18 - 26)  SpO2: 100% (22 @ 14:00) (92% - 100%)    22 @ 07:01  -  22 @ 07:00  --------------------------------------------------------  IN: 2302.7 mL / OUT: 625 mL / NET: 1677.7 mL    22 @ 07:01  -  22 @ 14:08  --------------------------------------------------------  IN: 634.1 mL / OUT: 310 mL / NET: 324.1 mL      Mode: AC/ CMV (Assist Control/ Continuous Mandatory Ventilation), RR (machine): 10, TV (machine): 400, FiO2: 60, PEEP: 5, MAP: 11, PIP: 28  PHYSICAL EXAM:  Constitutional: frail, elderly, intubated, sedated  Eyes: closed  ENMT: ETT in place  Neck: No thyroid nodules appreciated, no significant cervical or supraclavicular lymphadenopathy; neck appears thicker than would be expected based on body habitus but no crepitus/hematoma appreciated  Respiratory: Breathing nonlabored; clear to auscultation  Cardiovascular: Regular rate and rhythm  Gastrointestinal: Soft, nontender, nondistended, normoactive bowel sounds; no hepatosplenomegaly appreciated; no rebound tenderness or involuntary guarding  Extremities: No clubbing, cyanosis or edema  Skin: mild jaundice  Lymph Nodes: No significant lymphadenopathy  Musculoskeletal: No significant peripheral atrophy    LABS:             11.8   7.10  )-----------( 54       (  @ 02:18 )             34.2                10.8   7.48  )-----------( 62       (  @ 19:20 )             30.8                11.6   8.92  )-----------( 65       ( 06 @ 13:56 )             33.3       PT/INR - ( 07 Aug 2022 04:00 )   PT: 14.3 sec;   INR: 1.23 ratio         PTT - ( 07 Aug 2022 04:00 )  PTT:36.5 sec      144  |  114<H>  |  78.6<H>  ----------------------------<  109<H>  4.2   |  21.0<L>  |  1.50<H>    Ca    7.0<L>      07 Aug 2022 02:00  Mg     2.7         TPro  5.5<L>  /  Alb  2.4<L>  /  TBili  3.3<H>  /  DBili  x   /  AST  79<H>  /  ALT  80<H>  /  AlkPhos  97      LIVER FUNCTIONS - ( 07 Aug 2022 02:00 )  Alb: 2.4 g/dL / Pro: 5.5 g/dL / ALK PHOS: 97 U/L / ALT: 80 U/L / AST: 79 U/L / GGT: x               Urinalysis Basic - ( 06 Aug 2022 16:58 )    Color: Yellow / Appearance: Clear / S.010 / pH: x  Gluc: x / Ketone: Trace  / Bili: Small / Urobili: 4 mg/dL   Blood: x / Protein: 15 / Nitrite: Negative   Leuk Esterase: Trace / RBC: 3-5 /HPF / WBC 0-2 /HPF   Sq Epi: x / Non Sq Epi: Few / Bacteria: x        Culture - Sputum (collected 06 Aug 2022 21:30)  Source: .Sputum Sputum  Gram Stain (07 Aug 2022 08:53):    Numerous polymorphonuclear leukocytes per low power field    No Squamous epithelial cells per low power field    Numerous Gram Negative Rods seen per oil power field      IMAGING: I personally reviewed the CTA and AUS from , and I agree with the radiologist's interpretation as described below:  AUS: + GB stones/sludge, + ascites, + pericholecystic fluid, no GB wall thickening  CTA: cirrhosis RLL, JOELLEN ? PNA vs blood, 13 cm spleen, ? colitis, NGT in stomach

## 2022-08-14 NOTE — PROGRESS NOTE ADULT - ASSESSMENT
72F PMH HCV, cirrhosis, asthma, HTN, who [place] [facility] [place] for evaluation of hemoptysis, acute respiratory failure s/p intubation, found with Klebsiella PNA, pancolitis, acute renal failure requiring CVVHD, AFib with RVR requiring esmolol infusion    Impression/Plan:    Klebsiella PNA  Pancolitis  Sepsis - with shock  - C/w CTX + Flagyl  - Monitor peak airway pressures  - C/w full ventilator support - is on 320/18/100%/12  - Daily SAT/SBT  - Monitor pressor requirements - remains on Levophed, titrate to MAP >65  - Starting Midodrine 10mg Q8H  - Starting Hydrocortisone 50mg IV Q8H  - A-line placed overnight L axilla    Need for ventilator support  - Sedation with Propofol/Fentanyl  - Wean sedation and vent requirements on a daily basis    Acute renal failure  - C/w CVVHD  - Renally adjust all medications  - Avoid nephrotoxins  - F/u nephrology    AFib with RVR/SVT  - C/w Esmolol infusion  - Monitor rhythm on telemetry    Hyperammonemia  H/o HCV cirrhosis  - Lactulose  - Added Rifaximin 550mg BID  - Trend ammonia level    F/E/N/PPx/Lines  - Electrolyte and fluid management with CVVHD  - NPO - had vomiting episode overnight and so tube feeds held  - VTE PPx - SCDs  - L IJ CVC (8/13- )  - R IJ HD catheter (8/13- )  - L axillary A-line (8/14- )    Dispo/Ethics  - DNR  - C/w care in ICU  - Prognosis guarded      Brandon Cummings M.D.  Pulmonary & Critical Care Medicine  [facility]  Pulmonary and Sleep Medicine at [place]  [address]  [place] [address]  T: [phone]  F: [phone] 72F PMH HCV, cirrhosis, asthma, HTN, who [place] [facility] [place] of hemoptysis, acute respiratory failure s/p intubation, found with Klebsiella PNA, pancolitis, acute renal failure requiring CVVHD, AFib with RVR requiring esmolol infusion    Impression/Plan:    Klebsiella PNA  Pancolitis  Sepsis - with shock  - Previously on CTX + Flagyl, switched to Meropenem (8/14- )  - Monitor peak airway pressures  - C/w full ventilator support - increased TV and RR - now 400/26/80%/10 given hypercarbia  - Daily SAT/SBT if appropriate  - Monitor pressor requirements - remains on Levophed, titrate to MAP >65  - Starting Midodrine 10mg Q8H  - Starting Hydrocortisone 50mg IV Q8H  - A-line placed overnight L axilla    Need for ventilator support  - Sedation with Propofol/Fentanyl  - Wean sedation and vent requirements on a daily basis if appropriate    Acute renal failure  Worsening metabolic acidosis  - S/p 3amps of sodium bicarb overnight, started on full strength bicarb infusion 150mEq @ 75cc/hr  - C/w CVVHD  - Renally adjust all medications  - Avoid nephrotoxins  - F/u nephrology    AFib with RVR/SVT  - C/w Esmolol infusion  - Monitor rhythm on telemetry    Hyperammonemia  H/o HCV cirrhosis  - Lactulose  - Added Rifaximin 550mg BID  - Trend ammonia level    F/E/N/PPx/Lines  - Electrolyte and fluid management with CVVHD  - NPO - had vomiting episode overnight and so tube feeds held  - VTE PPx - SCDs  - L IJ CVC (8/13- )  - R IJ HD catheter (8/13- )  - L axillary A-line (8/14- )    Dispo/Ethics  - DNR  - C/w care in ICU  - Prognosis guarded to poor      Brandon Cummings M.D.  Pulmonary & Critical Care Medicine  [facility]  Pulmonary and Sleep Medicine at [place]  [address]  [place] [address]  T: [phone]  F: [phone] 72F PMH HCV, cirrhosis, asthma, HTN, who presented on 8/6/22 from [facility] [place]ICU for evaluation of hemoptysis, acute respiratory failure s/p intubation, found with Klebsiella PNA, pancolitis, acute renal failure requiring CVVHD, AFib with RVR requiring esmolol infusion    Impression/Plan:    Klebsiella PNA  Pancolitis  Sepsis - with shock  Worsening lactic acidosis  - Previously on CTX + Flagyl, switched to Meropenem (8/14- )  - Monitor peak airway pressures  - C/w full ventilator support - increased TV and RR - now 420/30/80%/10 given hypercarbia  - Daily SAT/SBT if appropriate  - Monitor pressor requirements - remains on Levophed, titrate to MAP >65  - Starting Midodrine 10mg Q8H  - Starting Hydrocortisone 50mg IV Q8H  - A-line placed overnight L axilla    Need for ventilator support  - Sedation with Propofol/Fentanyl  - Wean sedation and vent requirements on a daily basis if appropriate    Acute renal failure  Worsening metabolic acidosis  - S/p 3amps of sodium bicarb overnight, started on full strength bicarb infusion 150mEq @ 100cc/hr  - C/w CVVHD  - Renally adjust all medications  - Avoid nephrotoxins  - F/u nephrology    AFib with RVR/SVT  - C/w Esmolol infusion  - Monitor rhythm on telemetry    Hyperammonemia  H/o HCV cirrhosis  - Lactulose  - Added Rifaximin 550mg BID  - Trend ammonia level    F/E/N/PPx/Lines  - Electrolyte and fluid management with CVVHD  - NPO - had vomiting episode overnight and so tube feeds held  - VTE PPx - SCDs  - L IJ CVC (8/13- )  - R IJ HD catheter (8/13- )  - L axillary A-line (8/14- )    Dispo/Ethics  - DNR  - C/w care in ICU  - Prognosis poor      Brandon Cummings M.D.  Pulmonary & Critical Care Medicine  [facility]  Pulmonary and Sleep Medicine at [place]  [address]  [place], [place] [address]  T: [phone]  F: [phone]

## 2022-08-14 NOTE — PROGRESS NOTE ADULT - NUTRITIONAL ASSESSMENT
This patient has been assessed with a concern for Malnutrition and has been determined to have a diagnosis/diagnoses of Severe protein-calorie malnutrition and Underweight (BMI < 19).    This patient is being managed with:   Diet NPO with Tube Feed-  Tube Feeding Modality: Orogastric  Jevity 1.5 Jad (JEVITY1.5)  Total Volume for 24 Hours (mL): 960  Continuous  Starting Tube Feed Rate {mL per Hour}: 10  Increase Tube Feed Rate by (mL): 10     Every 4 hours  Until Goal Tube Feed Rate (mL per Hour): 40  Tube Feed Duration (in Hours): 24  Tube Feed Start Time: 08:00  Entered: Aug 10 2022  7:55AM    
This patient has been assessed with a concern for Malnutrition and has been determined to have a diagnosis/diagnoses of Severe protein-calorie malnutrition and Underweight (BMI < 19).    This patient is being managed with:   Diet NPO-  Entered: Aug  6 2022  1:37PM    
This patient has been assessed with a concern for Malnutrition and has been determined to have a diagnosis/diagnoses of Severe protein-calorie malnutrition and Underweight (BMI < 19).    This patient is being managed with:   Diet NPO-  Entered: Aug  6 2022  1:37PM    
This patient has been assessed with a concern for Malnutrition and has been determined to have a diagnosis/diagnoses of Severe protein-calorie malnutrition and Underweight (BMI < 19).    This patient is being managed with:   Diet NPO-  Except Medications  Entered: Aug 13 2022 11:44PM

## 2022-08-14 NOTE — PROGRESS NOTE ADULT - SUBJECTIVE AND OBJECTIVE BOX
Patient is a 72y old  Female who presents with a chief complaint of Hypoxia, Hemoptysis (13 Aug 2022 14:45)      BRIEF HOSPITAL COURSE:   72F PMH HCV, cirrhosis, asthma, HTN, who presented on 8/6/22 [place] [facility] [place] [facility] for evaluation of hemoptysis, acute respiratory failure s/p intubation, found with Klebsiella PNA, pancolitis, acute renal failure requiring CVVHD, AFib with RVR requiring esmolol infusion. S/p bronch on 8/7/22 in CTICU that did not show any source of hemoptysis. She was extubated to BPAP on 8/7/22, transferred to MICU on 8/8/22 and then re-intubated on 8/9/22 for worsening respiratory failure. During her course she was made DNR. Found with elevated ammonia and started on Lactulose and Rifaximin.       PAST MEDICAL & SURGICAL HISTORY:  Major Depressive Disorder      OCD (Obsessive Compulsive Disorder)      Anxiety      Hepatitis C      Cirrhosis      Hypertension      Acute asthma exacerbation      S/P Liver Biopsy            Medications:  cefTRIAXone   IVPB 2000 milliGRAM(s) IV Intermittent every 24 hours  metroNIDAZOLE  IVPB 500 milliGRAM(s) IV Intermittent every 8 hours    esMOLOL  Infusion 50 MICROgram(s)/kG/Min IV Continuous <Continuous>  norepinephrine Infusion 0.05 MICROgram(s)/kG/Min IV Continuous <Continuous>      fentaNYL   Infusion. 0.5 MICROgram(s)/kG/Hr IV Continuous <Continuous>  propofol Infusion 15.007 MICROgram(s)/kG/Min IV Continuous <Continuous>      heparin  Infusion Syringe 300 Unit(s)/Hr CRRT <Continuous>    lactulose Syrup 20 Gram(s) Oral every 6 hours  pantoprazole  Injectable 40 milliGRAM(s) IV Push daily      insulin glargine Injectable (LANTUS) 5 Unit(s) SubCutaneous every morning  insulin lispro (ADMELOG) corrective regimen sliding scale   SubCutaneous every 6 hours  vasopressin Infusion 0.04 Unit(s)/Min IV Continuous <Continuous>    albumin human 25% IVPB 25 milliLiter(s) IV Intermittent every 6 hours  sodium chloride 0.9% lock flush 10 milliLiter(s) IV Push every 1 hour PRN      chlorhexidine 0.12% Liquid 15 milliLiter(s) Oral Mucosa every 12 hours  chlorhexidine 2% Cloths 1 Application(s) Topical two times a day  chlorhexidine 4% Liquid 1 Application(s) Topical <User Schedule>    CRRT Treatment    <Continuous>      Mode: AC/ CMV (Assist Control/ Continuous Mandatory Ventilation)  RR (machine): 18  TV (machine): 320  FiO2: 80  PEEP: 12  ITime: 1  MAP: 17  PIP: 28      ICU Vital Signs Last 24 Hrs  T(C): 33.2 (14 Aug 2022 01:15), Max: 37 (13 Aug 2022 07:00)  T(F): 91.8 (14 Aug 2022 01:15), Max: 98.6 (13 Aug 2022 07:00)  HR: 72 (14 Aug 2022 01:15) (60 - 120)  BP: 101/71 (14 Aug 2022 01:00) (54/41 - 131/81)  BP(mean): 82 (14 Aug 2022 01:00) (35 - 98)  ABP: 144/60 (14 Aug 2022 01:15) (144/57 - 167/58)  ABP(mean): 92 (14 Aug 2022 01:15) (89 - 98)  RR: 18 (13 Aug 2022 23:15) (18 - 22)  SpO2: 100% (14 Aug 2022 01:15) (85% - 100%)    O2 Parameters below as of 13 Aug 2022 20:00  Patient On (Oxygen Delivery Method): ventilator    O2 Concentration (%): 100        ABG - ( 12 Aug 2022 10:44 )  pH, Arterial: 7.280 pH, Blood: x     /  pCO2: 48    /  pO2: 69    / HCO3: 23    / Base Excess: -4.1  /  SaO2: 96.3                I&O's Detail    12 Aug 2022 07:01  -  13 Aug 2022 07:00  --------------------------------------------------------  IN:    Enteral Tube Flush: 2100 mL    Esmolol: 374.2 mL    FentaNYL: 48.4 mL    IV PiggyBack: 100 mL    IV PiggyBack: 100 mL    IV PiggyBack: 300 mL    IV PiggyBack: 90 mL    Jevity 1.5: 890 mL    Lactated Ringers Bolus: 1000 mL    Propofol: 188.5 mL  Total IN: 5191.1 mL    OUT:    Indwelling Catheter - Urethral (mL): 230 mL  Total OUT: 230 mL    Total NET: 4961.1 mL      13 Aug 2022 07:01  -  14 Aug 2022 02:14  --------------------------------------------------------  IN:    dextrose 10%: 250 mL    Enteral Tube Flush: 1200 mL    Esmolol: 366.8 mL    FentaNYL: 2 mL    FentaNYL: 31.6 mL    IV PiggyBack: 50 mL    IV PiggyBack: 100 mL    IV PiggyBack: 200 mL    Jevity 1.5: 600 mL    Norepinephrine: 349.2 mL    Propofol: 16 mL    Propofol: 114 mL    Vasopressin: 2.4 mL  Total IN: 3282 mL    OUT:    Indwelling Catheter - Urethral (mL): 40 mL    Other (mL): 238 mL  Total OUT: 278 mL    Total NET: 3004 mL            LABS:                        10.5   7.04  )-----------( 39       ( 13 Aug 2022 05:00 )             35.2     08-13    140  |  109<H>  |  105.2<H>  ----------------------------<  160<H>  5.8<H>   |  19.0<L>  |  2.24<H>    Ca    7.8<L>      13 Aug 2022 05:00  Phos  6.8     08-13  Mg     2.7     08-13    TPro  5.5<L>  /  Alb  1.8<L>  /  TBili  3.9<H>  /  DBili  x   /  AST  34<H>  /  ALT  25  /  AlkPhos  69  08-12          CAPILLARY BLOOD GLUCOSE      POCT Blood Glucose.: 113 mg/dL (14 Aug 2022 01:58)        CULTURES:  Rapid RVP Result: NotDetec (08-08 @ 02:15)  Culture Results:   Numerous Klebsiella pneumoniae  Normal Respiratory Estefany absent (08-07 @ 16:30)      Physical Examination:  GENERAL: Intubated and sedated  HEENT: NC/AT  NECK: Supple, trachea midline  PULM: Coarse mechanical breath sounds B/L  CVS: +S1, S2  ABD: Soft, non-tender  EXTREMITIES: Anasarca  SKIN: No open wounds  NEURO: Sedated    DEVICES:     RADIOLOGY:   < from: CT Chest No Cont (08.09.22 @ 14:47) >    ACC: [id number] EXAM:  CT CHEST                        ACC: [id number] EXAM:  CT ABDOMEN AND PELVIS                          PROCEDURE DATE:  08/09/2022          INTERPRETATION:  CLINICAL INFORMATION: Hemoptysis. Pneumonia. Sepsis.   Rising lactate. History of cirrhosis.    COMPARISON: None.    PROCEDURE:  CT of the Chest, Abdomen and Pelvis was performed.  Sagittal and coronal reformats were performed.    FINDINGS:  CHEST:  LUNGS AND LARGE AIRWAYS: Patent central airways. Endotracheal tube in   place. There is rounded consolidative opacity with air bronchograms in   the right lower lobe posteriorly. There are and geographic groundglass   and consolidative opacities throughout the upper lobes and right middle   lobe and mild consolidative opacity in the left lower lobe with adjacent   groundglass density.  PLEURA: Mild layering right pleural effusion and trace layering left   pleural effusion. There is mild to moderate superior pneumomediastinum   extending into the neck.  VESSELS: Within normal limits.  HEART: Heart size is normal. No pericardial effusion.  MEDIASTINUM AND TOM: No lymphadenopathy.  CHEST WALL AND LOWER NECK: Within normal limits.    ABDOMEN AND PELVIS:  LIVER: Shrunken nodular liver, compatible with end-stage cirrhosis.  BILE DUCTS: Normal caliber.  GALLBLADDER: Layering small gallstones.  SPLEEN: Within normal limits.  PANCREAS: Within normal limits.  ADRENALS: Within normal limits.  KIDNEYS/URETERS: Within normal limits.    BLADDER: Mortensen catheter in the collapsed bladder.  REPRODUCTIVE ORGANS: Uterus and adnexa within normal limits.    BOWEL: Diffuse moderate concentric low-attenuation wall thickening of the   colon with moderate fluid infiltration of the mesentery. No pneumatosis   or extraluminal gas. Small bowel loops are unremarkable with no evidence   of obstruction. Numerous prominent mesenteric lymph nodes, likely   reactive.  PERITONEUM: Mild to moderate free fluid throughout the peritoneal cavity.  VESSELS: Within normal limits.  RETROPERITONEUM/LYMPH NODES: No lymphadenopathy.  ABDOMINAL WALL: Within normal limits.  BONES: Healed left humeral neck fracture with secondary degenerative   changes of the humeral head.      IMPRESSION:  1. Multilobar pneumonia.  2. Mild to moderate pneumomediastinum extending into the neck.  3. Pancolitis, likely infectious.  4. Cirrhotic liver with ascites.  5. Results discussed with Dr. Faria at time of interpretation.    --- End of Report ---            SARA CONCEPCION MD; Attending Radiologist  This document has been electronically signed. Aug  9 2022  2:59PM    < end of copied text >    ~~~~~~~~~~~~~~~~~~~~~~~~~~~~~~~~~~~~~~~~~~~~~~~~~~~~~~~~~~~~~~    < from: TTE Echo Complete w/ Contrast w/ Doppler (08.08.22 @ 12:36) >  Summary:   1. Technically difficult study.   2. Left ventricular ejection fraction, by visual estimation, is 60 to   65%.   3. Normal global left ventricular systolic function.   4. The mitral in-flow pattern reveals no discernable A-wave, therefore   no comment on diastolic function can be made.   5. Normal left ventricular internal cavity size.   6. Normal right ventricular size and function.   7. Moderately enlarged left atrium.   8. Mildly enlarged right atrium.   9. Mild thickening and calcification of the anterior and posterior   mitral valve leaflets.  10. Mild mitral valve regurgitation.  11. Sclerotic aortic valve with normal opening.  12. Mild-moderate tricuspid regurgitation.  13. There is no evidence of pericardial effusion.    MD Verónica Electronically signed on 8/8/2022 at 5:13:13 PM            *** Final ***    < end of copied text >

## 2022-08-14 NOTE — PROCEDURE NOTE - NSPROCNAME_GEN_A_CORE
Central Line Insertion
Arterial Puncture/Cannulation
Tracheal Intubation
Central Line Insertion
Arterial Puncture/Cannulation

## 2022-08-14 NOTE — GOALS OF CARE CONVERSATION - ADVANCED CARE PLANNING - CONVERSATION DETAILS
Updated spouse Giuliano, gave updates in care, patient acutely decompensating with worsening metabolic and respiratory acidosis despite CVVH and bicarb infusion. Pt is already DNR. He agrees to shift focus to comfort measures only. He will be coming to the bedside around 9-10AM and will make decision then on palliative extubation.       Brandon Cummings M.D.  Pulmonary & Critical Care Medicine  [facility]  Pulmonary and Sleep Medicine at [place]  [address]  [place], [place] [address]  T: [phone]  F: [phone] Updated spouse Giuliano, gave updates in care, patient acutely decompensating with worsening metabolic and respiratory acidosis despite CVVH and bicarb infusion. Pt is already DNR. He agrees to shift focus to comfort measures only. Will cap pressors. He will be coming to the bedside around 9-10AM and will make decision then on palliative extubation.       Brandon Cummings M.D.  Pulmonary & Critical Care Medicine  [facility]  Pulmonary and Sleep Medicine at [place]  [address]  [place] [address]  T: [phone]  F: [phone] Updated spouse Giuliano, gave updates in care, patient acutely decompensating with worsening metabolic and respiratory acidosis despite CVVH and bicarb infusion. Pt is already DNR. He agrees to shift focus to comfort measures only. Will cap pressors and D/C non-essential medications - Lactulose, Rifaximin, Midodrine, PPI, sliding scale.  will be coming to the bedside around 9-10AM and will make decision then on palliative extubation.       Brandon Cummings M.D.  Pulmonary & Critical Care Medicine  [facility]  Pulmonary and Sleep Medicine at [place]  [address]  [place], [place] [address]  T: [phone]  F: [phone] Updated spouse Giuliano, gave updates in care, patient acutely decompensating with worsening metabolic and respiratory acidosis despite CVVH and bicarb infusion. Pt is already DNR. He agrees to shift focus to comfort measures only. Will cap pressors and D/C non-essential medications - Lactulose, Rifaximin, Midodrine, PPI, sliding scale, antibiotics.  will be coming to the bedside around 9-10AM and will make decision then on palliative extubation.       Brandon Cummings M.D.  Pulmonary & Critical Care Medicine  [facility]  Pulmonary and Sleep Medicine at [place]  [address]  [place], [place] [address]  T: [phone]  F: [phone]

## 2022-08-14 NOTE — DISCHARGE NOTE FOR THE EXPIRED PATIENT - HOSPITAL COURSE
72F PMH HCV, cirrhosis, asthma, HTN, who presented on 22 from [facility] [place] to [facility] CTICU for evaluation of hemoptysis, acute respiratory failure s/p intubation, found with Klebsiella PNA, pancolitis, acute renal failure requiring CVVHD, AFib with RVR requiring esmolol infusion.  Family elected to transition to comfort measures only, patient  on .

## 2022-08-14 NOTE — PROCEDURE NOTE - NSINDICATIONS_GEN_A_CORE
arterial puncture to obtain ABG's/blood sampling/monitoring purposes
dialysis/CRRT
airway protection/respiratory distress/respiratory failure
arterial puncture to obtain ABG's/blood sampling/monitoring purposes

## 2022-08-14 NOTE — PROGRESS NOTE ADULT - PROVIDER SPECIALTY LIST ADULT
Critical Care
MICU
Critical Care
MICU
Critical Care
Critical Care
MICU
Thoracic Surgery
Thoracic Surgery

## 2022-08-14 NOTE — PROGRESS NOTE ADULT - REASON FOR ADMISSION
Hypoxia, Hemoptysis

## 2022-08-14 NOTE — PROCEDURE NOTE - NSPROCDETAILS_GEN_ALL_CORE
guidewire recovered/lumen(s) aspirated and flushed/sterile dressing applied/sterile technique, catheter placed/ultrasound guidance with use of sterile gel and probe cove
patient pre-oxygenated, tube inserted, placement confirmed
location identified, draped/prepped, sterile technique used, needle inserted/introduced/positive blood return obtained via catheter/connected to a pressurized flush line/hemostasis with direct pressure, dressing applied/Seldinger technique/all materials/supplies accounted for at end of procedure
guidewire recovered/lumen(s) aspirated and flushed/sterile dressing applied/sterile technique, catheter placed/ultrasound guidance with use of sterile gel and probe cove
location identified, draped/prepped, sterile technique used, needle inserted/introduced/positive blood return obtained via catheter/connected to a pressurized flush line/sutured in place/hemostasis with direct pressure, dressing applied/Seldinger technique/all materials/supplies accounted for at end of procedure

## 2022-08-15 DIAGNOSIS — J96.90 RESPIRATORY FAILURE, UNSPECIFIED, UNSPECIFIED WHETHER WITH HYPOXIA OR HYPERCAPNIA: ICD-10-CM

## 2022-08-15 DIAGNOSIS — N17.9 ACUTE KIDNEY FAILURE, UNSPECIFIED: ICD-10-CM

## 2022-08-15 DIAGNOSIS — R04.2 HEMOPTYSIS: ICD-10-CM

## 2022-08-15 DIAGNOSIS — R41.82 ALTERED MENTAL STATUS, UNSPECIFIED: ICD-10-CM

## 2022-09-02 ENCOUNTER — APPOINTMENT (OUTPATIENT)
Dept: GERIATRICS | Facility: CLINIC | Age: 72
End: 2022-09-02

## 2022-11-17 NOTE — END OF VISIT
[Time Spent: ___ minutes] : I have spent [unfilled] minutes of time on the encounter. Detail Level: Detailed Depth Of Biopsy: dermis Was A Bandage Applied: Yes Size Of Lesion In Cm: 0.2 X Size Of Lesion In Cm: 0 Biopsy Type: H and E Biopsy Method: Dermablade Anesthesia Type: 1% lidocaine with epinephrine Anesthesia Volume In Cc (Will Not Render If 0): 0.5 Hemostasis: Drysol Wound Care: Petrolatum Dressing: bandage Destruction After The Procedure: No Type Of Destruction Used: Curettage Curettage Text: The wound bed was treated with curettage after the biopsy was performed. Cryotherapy Text: The wound bed was treated with cryotherapy after the biopsy was performed. Electrodesiccation Text: The wound bed was treated with electrodesiccation after the biopsy was performed. Electrodesiccation And Curettage Text: The wound bed was treated with electrodesiccation and curettage after the biopsy was performed. Silver Nitrate Text: The wound bed was treated with silver nitrate after the biopsy was performed. Lab: [id number] Consent: Written consent was obtained and risks were reviewed including but not limited to scarring, infection, bleeding, scabbing, incomplete removal, nerve damage and allergy to anesthesia. Post-Care Instructions: I reviewed with the patient in detail post-care instructions. Patient is to keep the biopsy site dry overnight, and then apply bacitracin twice daily until healed. Patient may apply hydrogen peroxide soaks to remove any crusting. Notification Instructions: Patient will be notified of biopsy results. However, patient instructed to call the office if not contacted within 2 weeks. Billing Type: Third-Party Bill Information: Selecting Yes will display possible errors in your note based on the variables you have selected. This validation is only offered as a suggestion for you. PLEASE NOTE THAT THE VALIDATION TEXT WILL BE REMOVED WHEN YOU FINALIZE YOUR NOTE. IF YOU WANT TO FAX A PRELIMINARY NOTE YOU WILL NEED TO TOGGLE THIS TO 'NO' IF YOU DO NOT WANT IT IN YOUR FAXED NOTE.